# Patient Record
Sex: FEMALE | Race: WHITE | URBAN - METROPOLITAN AREA
[De-identification: names, ages, dates, MRNs, and addresses within clinical notes are randomized per-mention and may not be internally consistent; named-entity substitution may affect disease eponyms.]

---

## 2024-04-29 ENCOUNTER — HOSPITAL ENCOUNTER (OUTPATIENT)
Age: 64
Discharge: HOME | End: 2024-04-29
Payer: COMMERCIAL

## 2024-04-29 VITALS
OXYGEN SATURATION: 98 % | TEMPERATURE: 98 F | HEART RATE: 72 BPM | RESPIRATION RATE: 20 BRPM | SYSTOLIC BLOOD PRESSURE: 153 MMHG | DIASTOLIC BLOOD PRESSURE: 86 MMHG

## 2024-04-29 DIAGNOSIS — S76.011A HIP STRAIN, RIGHT, INITIAL ENCOUNTER: Primary | ICD-10-CM

## 2024-04-29 PROCEDURE — 99203 OFFICE O/P NEW LOW 30 MIN: CPT | Performed by: NURSE PRACTITIONER

## 2024-04-29 RX ORDER — ESCITALOPRAM OXALATE 10 MG/1
10 TABLET ORAL DAILY
COMMUNITY
Start: 2024-02-21

## 2024-04-29 RX ORDER — NIFEDIPINE 30 MG/1
TABLET, EXTENDED RELEASE ORAL
COMMUNITY
Start: 2024-03-13

## 2024-04-29 RX ORDER — TRIAMTERENE AND HYDROCHLOROTHIAZIDE 37.5; 25 MG/1; MG/1
CAPSULE ORAL
COMMUNITY
Start: 2024-03-28

## 2024-04-29 RX ORDER — TIZANIDINE 2 MG/1
2 TABLET ORAL EVERY 6 HOURS PRN
Qty: 15 TABLET | Refills: 0 | Status: SHIPPED | OUTPATIENT
Start: 2024-04-29

## 2024-04-29 RX ORDER — AMITRIPTYLINE HYDROCHLORIDE 10 MG/1
TABLET, FILM COATED ORAL
COMMUNITY
Start: 2024-04-01

## 2024-04-29 RX ORDER — CLOBETASOL PROPIONATE 0.5 MG/G
OINTMENT TOPICAL
COMMUNITY
Start: 2024-01-28

## 2024-04-29 RX ORDER — NAPROXEN 500 MG/1
500 TABLET ORAL 2 TIMES DAILY PRN
Qty: 20 TABLET | Refills: 0 | Status: SHIPPED | OUTPATIENT
Start: 2024-04-29 | End: 2024-05-09

## 2024-04-29 RX ORDER — VALACYCLOVIR HYDROCHLORIDE 500 MG/1
TABLET, FILM COATED ORAL
COMMUNITY
Start: 2024-04-21

## 2024-04-29 NOTE — ED INITIAL ASSESSMENT (HPI)
Pt here with complaints of right hip and buttocks pain that began 1 week ago , pt denies any injury , pt states was out of town and was sleeping on a bad mattress, pt states she has pain when bending or lifting

## 2025-01-06 ENCOUNTER — ORDER TRANSCRIPTION (OUTPATIENT)
Dept: PHYSICAL THERAPY | Facility: HOSPITAL | Age: 65
End: 2025-01-06

## 2025-01-06 DIAGNOSIS — S32.020A CLOSED COMPRESSION FRACTURE OF L2 VERTEBRA (HCC): ICD-10-CM

## 2025-01-06 DIAGNOSIS — M54.50 ACUTE MIDLINE LOW BACK PAIN WITHOUT SCIATICA: ICD-10-CM

## 2025-01-06 DIAGNOSIS — R29.6 FALLS FREQUENTLY: Primary | ICD-10-CM

## 2025-02-06 ENCOUNTER — TELEPHONE (OUTPATIENT)
Dept: PHYSICAL THERAPY | Facility: HOSPITAL | Age: 65
End: 2025-02-06

## 2025-02-07 ENCOUNTER — OFFICE VISIT (OUTPATIENT)
Dept: PHYSICAL THERAPY | Facility: HOSPITAL | Age: 65
End: 2025-02-07
Attending: PHYSICAL MEDICINE & REHABILITATION
Payer: COMMERCIAL

## 2025-02-07 DIAGNOSIS — R29.6 FALLS FREQUENTLY: Primary | ICD-10-CM

## 2025-02-07 DIAGNOSIS — S32.020A CLOSED COMPRESSION FRACTURE OF L2 VERTEBRA (HCC): ICD-10-CM

## 2025-02-07 DIAGNOSIS — M54.50 ACUTE MIDLINE LOW BACK PAIN WITHOUT SCIATICA: ICD-10-CM

## 2025-02-07 PROCEDURE — 97162 PT EVAL MOD COMPLEX 30 MIN: CPT

## 2025-02-14 ENCOUNTER — OFFICE VISIT (OUTPATIENT)
Dept: PHYSICAL THERAPY | Facility: HOSPITAL | Age: 65
End: 2025-02-14
Attending: PHYSICAL MEDICINE & REHABILITATION
Payer: COMMERCIAL

## 2025-02-14 PROCEDURE — 97112 NEUROMUSCULAR REEDUCATION: CPT

## 2025-02-14 PROCEDURE — 97530 THERAPEUTIC ACTIVITIES: CPT

## 2025-02-14 PROCEDURE — 97110 THERAPEUTIC EXERCISES: CPT

## 2025-02-14 PROCEDURE — 97140 MANUAL THERAPY 1/> REGIONS: CPT

## 2025-02-17 ENCOUNTER — OFFICE VISIT (OUTPATIENT)
Dept: PHYSICAL THERAPY | Facility: HOSPITAL | Age: 65
End: 2025-02-17
Attending: PHYSICAL MEDICINE & REHABILITATION
Payer: COMMERCIAL

## 2025-02-17 ENCOUNTER — ORDER TRANSCRIPTION (OUTPATIENT)
Dept: PHYSICAL THERAPY | Facility: HOSPITAL | Age: 65
End: 2025-02-17

## 2025-02-17 DIAGNOSIS — R29.6 FALLS FREQUENTLY: Primary | ICD-10-CM

## 2025-02-17 PROCEDURE — 97140 MANUAL THERAPY 1/> REGIONS: CPT

## 2025-02-17 PROCEDURE — 97110 THERAPEUTIC EXERCISES: CPT

## 2025-02-17 PROCEDURE — 97112 NEUROMUSCULAR REEDUCATION: CPT

## 2025-02-17 PROCEDURE — 97530 THERAPEUTIC ACTIVITIES: CPT

## 2025-02-18 NOTE — PROGRESS NOTES
SPINE EVALUATION:     Diagnosis:   Compresion fracture s/p kyphoplasty of L2 vertebrae, L knee pain and left leg pain Patient:  Lliiya Stockton (64 year old, female)        Referring Provider: Tamiko Flood  Today's Date   2/17/2025    Precautions:      Date of Evaluation: 02/07/25  Next MD visit: No data recorded  Date of Surgery: 12/12/24     PATIENT SUMMARY   Summary of chief complaints: Low back, left leg/knee pain.  Falls frequently.  Had a VNG done years ago.  History of current condition: Has had a history of falls in the last 2 years. Fell on stair case 12/8/24   Pain level: current  , at best  , at worst    Description of symptoms: Tightness with occassional numbness/tingling (feels sock-like numbness)   Occupation:     Leisure activities/Hobbies:     Prior level of function:    Current limitations: Trouble sitting (has to work at home with standing desk)  Pt goals: Increase mobility, decrease pain, and improve strength  Past medical history was reviewed with Liliya.  Significant findings include: Osteoporosis, L sensorineural complete hearing loss  Imaging/Tests: X-ray, MRI, DEXA scan   Liliya  has a past medical history of Essential hypertension.  She  has no past surgical history on file.    ASSESSMENT  Liliya presents to physical therapy evaluation with primary c/o Low back, left leg/knee pain.  Falls frequently.  Had a VNG done years ago.. The results of the objective tests and measures show  . Functional deficits include but are not limited to Trouble sitting (has to work at home with standing desk). Signs and symptoms are consistent with diagnosis of Compresion fracture s/p kyphoplasty of L2 vertebrae, L knee pain and left leg pain. Pt and PT discussed evaluation findings, pathology, POC and HEP.  Pt voiced understanding and performs HEP correctly without reported pain. Skilled Physical Therapy is medically necessary to address the above impairments and reach functional goals.    OBJECTIVE:    Musculoskeletal:  Observation/Posture: sits with stooped posture   Accessory Motion:     Palpation: hypotonic R QL, lumbar ES, lumbar PVM     Special tests:         CAROLINE ROM WNL and Strength (5/5) except below:  (* denotes performed with pain)  Trunk ROM MMT (-/5)     Flex         Ext        R L R L     Side bend             Rotation           ,   Hip   ROM MMT (-/5)    R L R L     Flex (L2)     5 4     Ext      4 4     Abd     4 3+     ER     4 3+     IR     5 4     ,   Knee   ROM MMT (-/5)    R L R L     Flex             Ext (L3)     4+ 4     ,   Ankle/Foot   ROM MMT (-/5)    R L R L     PF             DF (L4)             Inversion             Eversion             Grt Toe Ext (L5)               Flexibility:  LE Flexibility R L     Hip Flexor         Hamstrings         ITB         Piriformis         Quads         Gastroc-soleus            Neurological:  Sensation: Grossly intact to light touch CAROLINE UE/LE except hyposensitivity to L L4-S1   Deep Tendon Reflexes: WNL except WNL   UMN: signs and symptoms WNL except     Peripheral Neurodynamic: WNL except       Balance and Functional Mobility:  Gait: pt ambulates on level ground with  .  Balance: SLS: EO R  , EO L           Today's Treatment and Response:   Pt education was provided on exam findings, treatment diagnosis, treatment plan, expectations, and prognosis.  Today's Treatment       2/7/2025   PT Treatment   Total of Timed Procedures 0   Total of Service Based 0   Total Treatment Time 0         Patient was instructed in and issued a HEP for: Access Code: [email or web address]  URL: [email or web address]  Date: 02/17/2025  Prepared by: Jad Hernandez    Exercises  - Bird Dog on Counter  - 1 x daily - 7 x weekly - 2 sets - 3 seconds hold - 1 minute total time  - Standing Side Plank on Wall  - 1 x daily - 7 x weekly - 2 sets - 30 seconds hold  - Standing shoulder flexion wall slides  - 1 x daily - 7 x weekly - 2 sets - 1 minute time  - Standing Shoulder  Horizontal Abduction with Resistance  - 1 x daily - 7 x weekly - 2 sets - 2 hold - 1 minute time  - Seated Arch Lifts  - 1 x daily - 7 x weekly - 50 reps  - Toe Yoga - Alternating Great Toe and Lesser Toe Extension  - 1 x daily - 7 x weekly - 50 reps  - Toe Spreading  - 1 x daily - 7 x weekly - 50 reps    Charges:  PT EVAL: Moderate Complexity,    In agreement with evaluation findings and clinical rationale, this evaluation involved MODERATE COMPLEXITY decision making due to 1-2 personal factors/comorbidities, 3 or more body structures involved/activity limitations, and evolving symptoms as documented in the evaluation.                                                                         PLAN OF CARE:    Goals: (to be met in 12 visits)   Goals       Therapy Goals      Not Met Progress Toward Partially Met Met   Pt will improve transversus abdominis recruitment to perform proper isometric contraction without requiring verbal or tactile cuing to promote advancement of therex. [] [] [] []   Pt will demonstrate good understanding of proper posture and body mechanics to decrease pain and improve spinal safety. [] [] [] []          Pt will report improved symptom centralization and absence of radicular symptoms for 3 consecutive days to improve function with ADLs. [] [] [] []   Pt will have decreased paraspinal mm tension to tolerate standing >30 minutes for work and home activities. [] [] [] []   Pt will demonstrate improved core strength to be able to perform squat to chair with <2/10 pain. [] [] [] []   Pt will be independent and compliant with comprehensive HEP to maintain progress achieved in PT [] [] [] []                   Frequency / Duration: Patient will be seen 2x/week or a total of 12  visits over a 90 day period. Treatment will include: Manual Therapy; Neuromuscular Re-education; Home Exercise Program instruction; Therapeutic Activities; Therapeutic Exercise    Education or treatment limitation:  None   Rehab Potential: fair     Oswestry Disability Index Score  Score: (Proxy-Rptd) 54 % (2/7/2025  2:07 PM)      Patient/Family/Caregiver was advised of these findings, precautions, and treatment options and has agreed to actively participate in planning and for this course of care.    Thank you for your referral. Please co-sign or sign and return this letter via fax as soon as possible to [phone]. If you have any questions, please contact me at Dept: [phone]    Sincerely,  Electronically signed by therapist: Jad Hernandez PT  Physician's certification required: Yes  I certify the need for these services furnished under this plan of treatment and while under my care.    X___________________________________________________ Date____________________    Certification From: 2/17/2025  To:5/18/2025

## 2025-02-20 NOTE — PROGRESS NOTES
Patient: Liliya Stockton (64 year old, female) Referring Provider:  Insurance:   Diagnosis: Compresion fracture s/p kyphoplasty of L2 vertebrae, L knee pain and left leg pain Tamiko Mendozasalvador  [employer]   Date of Surgery: 12/12/24 Next MD visit:  N/A   Precautions:    No data recorded Referral Information:    Date of Evaluation: Req: 0, Auth: 0, Exp:     02/07/25 POC Auth Visits:          Today's Date   2/14/2025    Subjective          Pain: 5/10     Objective  see flowsheet                Assessment  tolerated all treatment well    Goals (to be met in 12 visits)   Goals       Therapy Goals      Not Met Progress Toward Partially Met Met   Pt will improve transversus abdominis recruitment to perform proper isometric contraction without requiring verbal or tactile cuing to promote advancement of therex. [] [] [] []   Pt will demonstrate good understanding of proper posture and body mechanics to decrease pain and improve spinal safety. [] [] [] []          Pt will report improved symptom centralization and absence of radicular symptoms for 3 consecutive days to improve function with ADLs. [] [] [] []   Pt will have decreased paraspinal mm tension to tolerate standing >30 minutes for work and home activities. [] [] [] []   Pt will demonstrate improved core strength to be able to perform squat to chair with <2/10 pain. [] [] [] []   Pt will be independent and compliant with comprehensive HEP to maintain progress achieved in PT [] [] [] []                  Plan  continue with plan of care    Treatment Last 4 Visits       2/7/2025 2/14/2025 2/19/2025   PT Treatment   Treatment Day   2   Therapeutic Exercise  Prone leg extension, alternating  Prone UE lift, alternating  Abdominal bracing, legs supported    Neuro Re-Ed  Toe yoga  Toe spreading  Herminio toe curls, 3 x 15      Manual Therapy  STM to L hip ER group    Therapeutic Activity  Mini squat on wall  Step up, 4'' step  Psoas stretch, manual    Therapeutic Exercise Min  15     Neuro Re-Ed Min  10    Manual Therapy Min  10    Therapeutic Activity Min  10    Total of Timed Procedures 0 45    Total of Service Based 0 0    Total Treatment Time 0 45          HEP  Access Code: [email or web address]  URL: [email or web address]  Date: 02/17/2025  Prepared by: Jad Hernandez    Exercises  - Bird Dog on Counter  - 1 x daily - 7 x weekly - 2 sets - 3 seconds hold - 1 minute total time  - Standing Side Plank on Wall  - 1 x daily - 7 x weekly - 2 sets - 30 seconds hold  - Standing shoulder flexion wall slides  - 1 x daily - 7 x weekly - 2 sets - 1 minute time  - Standing Shoulder Horizontal Abduction with Resistance  - 1 x daily - 7 x weekly - 2 sets - 2 hold - 1 minute time  - Seated Arch Lifts  - 1 x daily - 7 x weekly - 50 reps  - Toe Yoga - Alternating Great Toe and Lesser Toe Extension  - 1 x daily - 7 x weekly - 50 reps  - Toe Spreading  - 1 x daily - 7 x weekly - 50 reps    Charges

## 2025-02-21 ENCOUNTER — OFFICE VISIT (OUTPATIENT)
Dept: PHYSICAL THERAPY | Facility: HOSPITAL | Age: 65
End: 2025-02-21
Attending: PHYSICAL MEDICINE & REHABILITATION
Payer: COMMERCIAL

## 2025-02-21 PROCEDURE — 97112 NEUROMUSCULAR REEDUCATION: CPT

## 2025-02-21 PROCEDURE — 97110 THERAPEUTIC EXERCISES: CPT

## 2025-02-21 PROCEDURE — 97140 MANUAL THERAPY 1/> REGIONS: CPT

## 2025-02-21 NOTE — PROGRESS NOTES
Patient: Liliya Stockton (64 year old, female) Referring Provider:  Insurance:   Diagnosis: Compresion fracture s/p kyphoplasty of L2 vertebrae, L knee pain and left leg pain Tamiko Mendozasalvador  [employer]   Date of Surgery: 12/12/24 Next MD visit:  N/A   Precautions:    No data recorded Referral Information:    Date of Evaluation: Req: 0, Auth: 0, Exp:     02/07/25 POC Auth Visits:          Today's Date   2/21/2025    Subjective  Tuesday night had intense pain in the back and leg.  Patient reports that pain was far more significant.  Feels very stiff in movement.  Was working a great deal with high stress on Monday and Tuesday.  Notes that she was sitting for long period of time.       Pain: 7/10     Objective        See flowsheet         Assessment  session foicused on activation of abdominals    Goals (to be met in 12 visits)   Goals       Therapy Goals      Not Met Progress Toward Partially Met Met   Pt will improve transversus abdominis recruitment to perform proper isometric contraction without requiring verbal or tactile cuing to promote advancement of therex. [] [] [] []   Pt will demonstrate good understanding of proper posture and body mechanics to decrease pain and improve spinal safety. [] [] [] []          Pt will report improved symptom centralization and absence of radicular symptoms for 3 consecutive days to improve function with ADLs. [] [] [] []   Pt will have decreased paraspinal mm tension to tolerate standing >30 minutes for work and home activities. [] [] [] []   Pt will demonstrate improved core strength to be able to perform squat to chair with <2/10 pain. [] [] [] []   Pt will be independent and compliant with comprehensive HEP to maintain progress achieved in PT [] [] [] []                  Plan  continue with plan of care    Treatment Last 4 Visits       2/14/2025 2/17/2025 2/19/2025 2/21/2025   PT Treatment   Treatment Day   3    2 4   Therapeutic Exercise Prone leg extension,  alternating  Prone UE lift, alternating  Abdominal bracing, legs supported Prone leg extension, alternating  Prone UE lift, alternating  Abdominal bracing, legs supported       Neuro Re-Ed Toe yoga  Toe spreading  Harmony toe curls, 3 x 15   Toe yoga  Toe spreading  Harmony toe curls, 3 x 15    AB brace with breathing  Ab brace with march  Ab brace with shoulder extension into swiss ball   Manual Therapy STM to L hip ER group STM to L hip ER group  STM to L gluteals and piriformis   Therapeutic Activity Mini squat on wall  Step up, 4'' step  Psoas stretch, manual Mini squat on wall  Step up, 4'' step  Psoas stretch, manual      Dowel lilian hip hinge, seated  Discussion of lifting mechanics, verbalized and demonstrated proper form from floor   Therapeutic Exercise Min 15 10     Neuro Re-Ed Min 10 10  25   Manual Therapy Min 10 10  10   Therapeutic Activity Min 10 10  10   Total of Timed Procedures 45 40  45   Total of Service Based 0 0  0   Total Treatment Time 45 40  45       Multiple values from one day are sorted in reverse-chronological order         HEP  Access Code: [email or web address]  URL: [email or web address]  Date: 02/17/2025  Prepared by: Jad Hernandez    Exercises  - Bird Dog on Counter  - 1 x daily - 7 x weekly - 2 sets - 3 seconds hold - 1 minute total time  - Standing Side Plank on Wall  - 1 x daily - 7 x weekly - 2 sets - 30 seconds hold  - Standing shoulder flexion wall slides  - 1 x daily - 7 x weekly - 2 sets - 1 minute time  - Standing Shoulder Horizontal Abduction with Resistance  - 1 x daily - 7 x weekly - 2 sets - 2 hold - 1 minute time  - Seated Arch Lifts  - 1 x daily - 7 x weekly - 50 reps  - Toe Yoga - Alternating Great Toe and Lesser Toe Extension  - 1 x daily - 7 x weekly - 50 reps  - Toe Spreading  - 1 x daily - 7 x weekly - 50 reps    Charges     1 MT, 2 NMR, 1 TA

## 2025-02-28 ENCOUNTER — OFFICE VISIT (OUTPATIENT)
Dept: PHYSICAL THERAPY | Facility: HOSPITAL | Age: 65
End: 2025-02-28
Attending: PHYSICAL MEDICINE & REHABILITATION
Payer: COMMERCIAL

## 2025-02-28 PROCEDURE — 97530 THERAPEUTIC ACTIVITIES: CPT

## 2025-02-28 PROCEDURE — 97140 MANUAL THERAPY 1/> REGIONS: CPT

## 2025-02-28 PROCEDURE — 97110 THERAPEUTIC EXERCISES: CPT

## 2025-03-03 ENCOUNTER — OFFICE VISIT (OUTPATIENT)
Dept: PHYSICAL THERAPY | Facility: HOSPITAL | Age: 65
End: 2025-03-03
Attending: PHYSICAL MEDICINE & REHABILITATION
Payer: COMMERCIAL

## 2025-03-03 PROCEDURE — 97530 THERAPEUTIC ACTIVITIES: CPT

## 2025-03-03 PROCEDURE — 97112 NEUROMUSCULAR REEDUCATION: CPT

## 2025-03-03 PROCEDURE — 97110 THERAPEUTIC EXERCISES: CPT

## 2025-03-03 NOTE — PROGRESS NOTES
Patient: Liliya Stockton (64 year old, female) Referring Provider:  Insurance:   Diagnosis: Compresion fracture s/p kyphoplasty of L2 vertebrae, L knee pain and left leg pain Tamiko Mendozasalvador  [employer]   Date of Surgery: 12/12/24 Next MD visit:  N/A   Precautions:    No data recorded Referral Information:    Date of Evaluation: Req: 0, Auth: 0, Exp:     02/07/25 POC Auth Visits:          Today's Date   3/3/2025    Subjective  Has trouble with bending to lift things from floor.  Also, has generalized soreness in low back when walking or standing for a long duration.       Pain: 5/10     Objective  Poor hip hinge mechanics, hypolordosis of spine with bending.                Assessment  Patient lacks hip hinge during forward bending.  Patient required max cues for hip hinge in quadruped.  Utlized swiss ball under core for 4-point exercises to accomodate for weakness in all extremities.    Goals (to be met in 12 visits)   Goals       Therapy Goals      Not Met Progress Toward Partially Met Met   Pt will improve transversus abdominis recruitment to perform proper isometric contraction without requiring verbal or tactile cuing to promote advancement of therex. [] [] [] []   Pt will demonstrate good understanding of proper posture and body mechanics to decrease pain and improve spinal safety. [] [] [] []          Pt will report improved symptom centralization and absence of radicular symptoms for 3 consecutive days to improve function with ADLs. [] [] [] []   Pt will have decreased paraspinal mm tension to tolerate standing >30 minutes for work and home activities. [] [] [] []   Pt will demonstrate improved core strength to be able to perform squat to chair with <2/10 pain. [] [] [] []   Pt will be independent and compliant with comprehensive HEP to maintain progress achieved in PT [] [] [] []                  Plan  Continue with progressive core stabilization and balance.    Treatment Last 4 Visits       2/19/2025 2/21/2025  2/28/2025 3/3/2025   PT Treatment   Treatment Day 3    2 4  5   Therapeutic Exercise     AB brace with breathing  Ab brace with march  Ab brace with shoulder extension into swiss ball     Prone leg extension, alternating, 5'' hold x 10 each, 3 reps  Quadruped alternating tripod reach, 2 x 10  Bridge with resisted hip ABD, 2 x 10  Clamshell with abdominal activation, 3 x 10 each  Hook lying shoulder extension into ball, 10'' x 10 reps, 2 sets   Neuro Re-Ed  AB brace with breathing  Ab brace with march  Ab brace with shoulder extension into swiss ball  Wobble board taps fwd/back   Wobble board hovers  Single leg cone tap on foam   Manual Therapy  STM to L gluteals and piriformis STM to L gluteals and piriformis    Therapeutic Activity  Dowel lilian hip hinge, seated  Discussion of lifting mechanics, verbalized and demonstrated proper form from floor Dowel lilian hip hinge, seated  Discussion of lifting mechanics, verbalized and demonstrated proper form from floor   Hip hinge in quadruped (max cues)  Sit<>stand from elevated table  Rows, seated, green band (cues for increased scap retraction)  Seated press up, dowel lilian (add weight)   Therapeutic Exercise Min   15 15   Neuro Re-Ed Min  25  15   Manual Therapy Min  10 15    Therapeutic Activity Min  10 15 15   Total of Timed Procedures  45 45 45   Total of Service Based  0 0 0   Total Treatment Time  45 45 45       Multiple values from one day are sorted in reverse-chronological order         HEP  Access Code: [email or web address]  URL: [email or web address]  Date: 02/17/2025  Prepared by: Jad Hernandez    Exercises  - Bird Dog on Counter  - 1 x daily - 7 x weekly - 2 sets - 3 seconds hold - 1 minute total time  - Standing Side Plank on Wall  - 1 x daily - 7 x weekly - 2 sets - 30 seconds hold  - Standing shoulder flexion wall slides  - 1 x daily - 7 x weekly - 2 sets - 1 minute time  - Standing Shoulder Horizontal Abduction with Resistance  - 1 x daily - 7 x weekly  - 2 sets - 2 hold - 1 minute time  - Seated Arch Lifts  - 1 x daily - 7 x weekly - 50 reps  - Toe Yoga - Alternating Great Toe and Lesser Toe Extension  - 1 x daily - 7 x weekly - 50 reps  - Toe Spreading  - 1 x daily - 7 x weekly - 50 reps    Charges     1 TE, TA, NMR

## 2025-03-03 NOTE — PROGRESS NOTES
Patient: Liliya Stockton (64 year old, female) Referring Provider:  Insurance:   Diagnosis: Compresion fracture s/p kyphoplasty of L2 vertebrae, L knee pain and left leg pain Tamiko Mendozasalvador  [employer]   Date of Surgery: 12/12/24 Next MD visit:  N/A   Precautions:    No data recorded Referral Information:    Date of Evaluation: Req: 0, Auth: 0, Exp:     02/07/25 POC Auth Visits:          Today's Date   2/28/2025    Subjective  Feels less pain in her legs.       Pain: 6/10     Objective  see flowsheet                Assessment  session foicused on activation of abdominals    Goals (to be met in 12 visits)   Goals       Therapy Goals      Not Met Progress Toward Partially Met Met   Pt will improve transversus abdominis recruitment to perform proper isometric contraction without requiring verbal or tactile cuing to promote advancement of therex. [] [] [] []   Pt will demonstrate good understanding of proper posture and body mechanics to decrease pain and improve spinal safety. [] [] [] []          Pt will report improved symptom centralization and absence of radicular symptoms for 3 consecutive days to improve function with ADLs. [] [] [] []   Pt will have decreased paraspinal mm tension to tolerate standing >30 minutes for work and home activities. [] [] [] []   Pt will demonstrate improved core strength to be able to perform squat to chair with <2/10 pain. [] [] [] []   Pt will be independent and compliant with comprehensive HEP to maintain progress achieved in PT [] [] [] []                  Plan  continue with plan of care    Treatment Last 4 Visits       2/17/2025 2/19/2025 2/21/2025 2/28/2025   PT Treatment   Treatment Day  3    2 4    Therapeutic Exercise Prone leg extension, alternating  Prone UE lift, alternating  Abdominal bracing, legs supported       AB brace with breathing  Ab brace with march  Ab brace with shoulder extension into swiss ball       Neuro Re-Ed Toe yoga  Toe spreading  Herminio toe curls, 3 x  15    AB brace with breathing  Ab brace with march  Ab brace with shoulder extension into swiss ball    Manual Therapy STM to L hip ER group  STM to L gluteals and piriformis STM to L gluteals and piriformis   Therapeutic Activity Mini squat on wall  Step up, 4'' step  Psoas stretch, manual      Dowel lilian hip hinge, seated  Discussion of lifting mechanics, verbalized and demonstrated proper form from floor Dowel lilian hip hinge, seated  Discussion of lifting mechanics, verbalized and demonstrated proper form from floor     Therapeutic Exercise Min 10   15   Neuro Re-Ed Min 10  25    Manual Therapy Min 10  10 15   Therapeutic Activity Min 10  10 15   Total of Timed Procedures 40  45 45   Total of Service Based 0  0 0   Total Treatment Time 40  45 45       Multiple values from one day are sorted in reverse-chronological order         HEP  Access Code: [email or web address]  URL: [email or web address]  Date: 02/17/2025  Prepared by: Jad Hernandez    Exercises  - Bird Dog on Counter  - 1 x daily - 7 x weekly - 2 sets - 3 seconds hold - 1 minute total time  - Standing Side Plank on Wall  - 1 x daily - 7 x weekly - 2 sets - 30 seconds hold  - Standing shoulder flexion wall slides  - 1 x daily - 7 x weekly - 2 sets - 1 minute time  - Standing Shoulder Horizontal Abduction with Resistance  - 1 x daily - 7 x weekly - 2 sets - 2 hold - 1 minute time  - Seated Arch Lifts  - 1 x daily - 7 x weekly - 50 reps  - Toe Yoga - Alternating Great Toe and Lesser Toe Extension  - 1 x daily - 7 x weekly - 50 reps  - Toe Spreading  - 1 x daily - 7 x weekly - 50 reps    Charges     1 TA, TE, MT

## 2025-03-07 ENCOUNTER — OFFICE VISIT (OUTPATIENT)
Dept: PHYSICAL THERAPY | Facility: HOSPITAL | Age: 65
End: 2025-03-07
Attending: PHYSICAL MEDICINE & REHABILITATION
Payer: COMMERCIAL

## 2025-03-07 PROCEDURE — 97110 THERAPEUTIC EXERCISES: CPT

## 2025-03-07 PROCEDURE — 97530 THERAPEUTIC ACTIVITIES: CPT

## 2025-03-07 PROCEDURE — 97112 NEUROMUSCULAR REEDUCATION: CPT

## 2025-03-10 ENCOUNTER — OFFICE VISIT (OUTPATIENT)
Dept: PHYSICAL THERAPY | Facility: HOSPITAL | Age: 65
End: 2025-03-10
Attending: PHYSICAL MEDICINE & REHABILITATION
Payer: COMMERCIAL

## 2025-03-10 PROCEDURE — 97140 MANUAL THERAPY 1/> REGIONS: CPT

## 2025-03-10 PROCEDURE — 97112 NEUROMUSCULAR REEDUCATION: CPT

## 2025-03-10 PROCEDURE — 97530 THERAPEUTIC ACTIVITIES: CPT

## 2025-03-10 PROCEDURE — 97110 THERAPEUTIC EXERCISES: CPT

## 2025-03-10 NOTE — PROGRESS NOTES
Patient: Liliya Stockton (64 year old, female) Referring Provider:  Insurance:   Diagnosis: Compresion fracture s/p kyphoplasty of L2 vertebrae, L knee pain and left leg pain Tamiko Flood  [employer]   Date of Surgery: 12/12/24 Next MD visit:  N/A   Precautions:    No data recorded Referral Information:    Date of Evaluation: Req: 0, Auth: 0, Exp:     02/07/25 POC Auth Visits:          Today's Date   3/10/2025    Subjective  Went swimming for 20 minutes, and felt a good sore.  Still has stiffness/pain, and difficulty moving due to low back and sacral region pain.       Pain: 5/10     Objective  see flowsheet            Assessment  Hip tightness is causing overcompensation with the lower back to perform most movements.  Continues to have poor strength in posterior chain.    Goals (to be met in 12 visits)        Plan  Continue with progressive core stabilization and balance.    Treatment Last 4 Visits       2/21/2025 2/28/2025 3/3/2025 3/10/2025   PT Treatment   Treatment Day 4  5 7    6   Therapeutic Exercise    AB brace with breathing  Ab brace with march  Ab brace with shoulder extension into swiss ball     Prone leg extension, alternating, 5'' hold x 10 each, 3 reps  Quadruped alternating tripod reach, 2 x 10  Bridge with resisted hip ABD, 2 x 10  Clamshell with abdominal activation, 3 x 10 each  Hook lying shoulder extension into ball, 10'' x 10 reps, 2 sets    Neuro Re-Ed AB brace with breathing  Ab brace with march  Ab brace with shoulder extension into swiss ball  Wobble board taps fwd/back   Wobble board hovers  Single leg cone tap on foam    Manual Therapy STM to L gluteals and piriformis STM to L gluteals and piriformis     Therapeutic Activity Dowel lilian hip hinge, seated  Discussion of lifting mechanics, verbalized and demonstrated proper form from floor Dowel lilian hip hinge, seated  Discussion of lifting mechanics, verbalized and demonstrated proper form from floor   Hip hinge in quadruped (max  cues)  Sit<>stand from elevated table  Rows, seated, green band (cues for increased scap retraction)  Seated press up, dowel lilian (add weight)    Therapeutic Exercise Min  15 15    Neuro Re-Ed Min 25  15    Manual Therapy Min 10 15     Therapeutic Activity Min 10 15 15    Total of Timed Procedures 45 45 45    Total of Service Based 0 0 0    Total Treatment Time 45 45 45        Multiple values from one day are sorted in reverse-chronological order          2/19/2025 2/21/2025 3/3/2025 3/10/2025   Spine Treatment   Treatment Day 3    2 4 5 7    6   Therapeutic Exercise    Swiss ball quadruped alternating leg extensions  Glute bridge  Standing row, staggered stance  Hooklying with perurbations   Neuro Re-Education    Paloff press, standing, 3 x 15 each  Wall plank + rotation, 3 x 8 each     Manual Therapy    R gluteals STM  Manual R gluteal stretch  STM to B QL   Therapeutic Activity    Step up, with ab brace, 3 x 10  Squat on wall with ball, 2 x 20     Therapeutic Exercise Minutes    10   Neuro Re-Educ Minutes    10   Manual Therapy Minutes    10   Therapeutic Activity Minutes    10   Total Time Of Timed Procedures    40   Total Time Of Service-Based Procedures    0   Total Treatment Time    40       Multiple values from one day are sorted in reverse-chronological order        HEP       Charges

## 2025-03-11 NOTE — PROGRESS NOTES
Patient: Liliya Stockton (64 year old, female) Referring Provider:  Insurance:   Diagnosis: Compresion fracture s/p kyphoplasty of L2 vertebrae, L knee pain and left leg pain Tamiko Mendozasalvador [employer]   Date of Surgery: 12/12/24 Next MD visit:  N/A   Precautions:    No data recorded Referral Information:    Date of Evaluation: Req: 0, Auth: 0, Exp:     02/07/25 POC Auth Visits:          Today's Date   3/7/2025    Subjective  Feels a good soreness       Pain: 5/10     Objective  see flowsheet            Assessment  Hip tightness is causing overcompensation with the lower back to perform most movements.  Continues to have poor strength in posterior chain.    Goals (to be met in 12 visits)        Plan  Continue with progressive core stabilization and balance.    Treatment Last 4 Visits       2/21/2025 2/28/2025 3/3/2025 3/10/2025   PT Treatment   Treatment Day 4  5 6    7    6   Therapeutic Exercise    AB brace with breathing  Ab brace with march  Ab brace with shoulder extension into swiss ball     Prone leg extension, alternating, 5'' hold x 10 each, 3 reps  Quadruped alternating tripod reach, 2 x 10  Bridge with resisted hip ABD, 2 x 10  Clamshell with abdominal activation, 3 x 10 each  Hook lying shoulder extension into ball, 10'' x 10 reps, 2 sets    Neuro Re-Ed AB brace with breathing  Ab brace with march  Ab brace with shoulder extension into swiss ball  Wobble board taps fwd/back   Wobble board hovers  Single leg cone tap on foam    Manual Therapy STM to L gluteals and piriformis STM to L gluteals and piriformis     Therapeutic Activity Dowel lilian hip hinge, seated  Discussion of lifting mechanics, verbalized and demonstrated proper form from floor Dowel lilian hip hinge, seated  Discussion of lifting mechanics, verbalized and demonstrated proper form from floor   Hip hinge in quadruped (max cues)  Sit<>stand from elevated table  Rows, seated, green band (cues for increased scap retraction)  Seated press up, dowel  lilian (add weight)    Therapeutic Exercise Min  15 15    Neuro Re-Ed Min 25  15    Manual Therapy Min 10 15     Therapeutic Activity Min 10 15 15    Total of Timed Procedures 45 45 45    Total of Service Based 0 0 0    Total Treatment Time 45 45 45        Multiple values from one day are sorted in reverse-chronological order          2/21/2025 3/3/2025 3/7/2025 3/10/2025   Spine Treatment   Treatment Day 4 5  6    7    6   Therapeutic Exercise   Prone leg extension, alternating, 5'' hold x 10 each, 3 reps  Quadruped alternating tripod reach, 2 x 10  Bridge with resisted hip ABD, 2 x 10  Clamshell with abdominal activation, 3 x 10 each  Hook lying shoulder extension into ball, 10'' x 10 reps, 2 sets   Swiss ball quadruped alternating leg extensions  Glute bridge  Standing row, staggered stance  Hooklying with perurbations   Neuro Re-Education   Wobble board taps fwd/back   Wobble board hovers  Single leg cone tap on foam   Paloff press, standing, 3 x 15 each  Wall plank + rotation, 3 x 8 each     Manual Therapy    R gluteals STM  Manual R gluteal stretch  STM to B QL   Therapeutic Activity   Hip hinge in quadruped (max cues)  Sit<>stand from elevated table  Rows, seated, green band (cues for increased scap retraction)  Seated press up, dowel lilian (add weight)   Step up, with ab brace, 3 x 10  Squat on wall with ball, 2 x 20     Therapeutic Exercise Minutes   15 10   Neuro Re-Educ Minutes   15 10   Manual Therapy Minutes   15 10   Therapeutic Activity Minutes    10   Total Time Of Timed Procedures   45 40   Total Time Of Service-Based Procedures   0 0   Total Treatment Time   45 40       Multiple values from one day are sorted in reverse-chronological order        HEP       Charges                             (3) no apparent problem

## 2025-03-14 ENCOUNTER — OFFICE VISIT (OUTPATIENT)
Dept: PHYSICAL THERAPY | Facility: HOSPITAL | Age: 65
End: 2025-03-14
Attending: PHYSICAL MEDICINE & REHABILITATION
Payer: COMMERCIAL

## 2025-03-14 PROCEDURE — 97140 MANUAL THERAPY 1/> REGIONS: CPT

## 2025-03-14 PROCEDURE — 97112 NEUROMUSCULAR REEDUCATION: CPT

## 2025-03-14 PROCEDURE — 97110 THERAPEUTIC EXERCISES: CPT

## 2025-03-14 PROCEDURE — 97530 THERAPEUTIC ACTIVITIES: CPT

## 2025-03-14 NOTE — PROGRESS NOTES
Patient: Liliya Stockton (64 year old, female) Referring Provider:  Insurance:   Diagnosis: Compresion fracture s/p kyphoplasty of L2 vertebrae, L knee pain and left leg pain Tamiko Mendozasalvador  [employer]   Date of Surgery: 12/12/24 Next MD visit:  N/A   Precautions:    No data recorded Referral Information:    Date of Evaluation: Req: 0, Auth: 0, Exp:     02/07/25 POC Auth Visits:          Today's Date   3/14/2025    Subjective  feels less soreness with movements.  Still feels constant tightness in her back with most activities.       Pain: 5/10     Objective  see flowsheet          Assessment  Posterior pelvic mobilization allowed for improved hip and squat techniques.  Has very limited activation of the L glute compared to the R.  Provided education on HEP for clamshell with manual tactile cues to activate the posterior chain.    Goals (to be met in 12 visits)        Plan  Continue with progressive core stabilization and balance.    Treatment Last 4 Visits       2/28/2025 3/3/2025 3/10/2025 3/14/2025   PT Treatment   Treatment Day  5 6    7    6 7   Therapeutic Exercise   AB brace with breathing  Ab brace with march  Ab brace with shoulder extension into swiss ball     Prone leg extension, alternating, 5'' hold x 10 each, 3 reps  Quadruped alternating tripod reach, 2 x 10  Bridge with resisted hip ABD, 2 x 10  Clamshell with abdominal activation, 3 x 10 each  Hook lying shoulder extension into ball, 10'' x 10 reps, 2 sets     Neuro Re-Ed  Wobble board taps fwd/back   Wobble board hovers  Single leg cone tap on foam     Manual Therapy STM to L gluteals and piriformis      Therapeutic Activity Dowel lilian hip hinge, seated  Discussion of lifting mechanics, verbalized and demonstrated proper form from floor   Hip hinge in quadruped (max cues)  Sit<>stand from elevated table  Rows, seated, green band (cues for increased scap retraction)  Seated press up, dowel lilian (add weight)     Therapeutic Exercise Min 15 15     Neuro  Re-Ed Min  15     Manual Therapy Min 15      Therapeutic Activity Min 15 15     Total of Timed Procedures 45 45     Total of Service Based 0 0     Total Treatment Time 45 45         Multiple values from one day are sorted in reverse-chronological order          3/3/2025 3/7/2025 3/10/2025 3/14/2025   Spine Treatment   Treatment Day 5  6    7    6 7   Therapeutic Exercise  Prone leg extension, alternating, 5'' hold x 10 each, 3 reps  Quadruped alternating tripod reach, 2 x 10  Bridge with resisted hip ABD, 2 x 10  Clamshell with abdominal activation, 3 x 10 each  Hook lying shoulder extension into ball, 10'' x 10 reps, 2 sets   Swiss ball quadruped alternating leg extensions  Glute bridge  Standing row, staggered stance  Hooklying with perurbations Alternating LE extension, abdomen supported on swiss ball, 3 x 10  Cat/camel x 10  Standing rows, shoulder extension, 2 x 10, green     Neuro Re-Education  Wobble board taps fwd/back   Wobble board hovers  Single leg cone tap on foam   Paloff press, standing, 3 x 15 each  Wall plank + rotation, 3 x 8 each   Clam shell with manual feedback for gluteal activation  SL hip abduction with manual feedback  L side sciatic nerve flossers, manual   Manual Therapy   R gluteals STM  Manual R gluteal stretch  STM to B QL Posterior pelvic mobilization, R leg  STM to L hamstring and deep rotators   Therapeutic Activity  Hip hinge in quadruped (max cues)  Sit<>stand from elevated table  Rows, seated, green band (cues for increased scap retraction)  Seated press up, dowel lilian (add weight)   Step up, with ab brace, 3 x 10  Squat on wall with ball, 2 x 20   Potty squats, 3 x 10'', 24'' box  Shortstop hip hinge, 3 x 10     Therapeutic Exercise Minutes  15 10 10   Neuro Re-Educ Minutes  15 10 10   Manual Therapy Minutes  15 10 10   Therapeutic Activity Minutes   10 10   Total Time Of Timed Procedures  45 40 40   Total Time Of Service-Based Procedures  0 0 0   Total Treatment Time  45 40 40        Multiple values from one day are sorted in reverse-chronological order        HEP       Charges  1 NMR, TE, TA, MT

## 2025-03-19 ENCOUNTER — TELEPHONE (OUTPATIENT)
Dept: PHYSICAL THERAPY | Facility: HOSPITAL | Age: 65
End: 2025-03-19

## 2025-03-24 ENCOUNTER — TELEPHONE (OUTPATIENT)
Dept: PHYSICAL THERAPY | Facility: HOSPITAL | Age: 65
End: 2025-03-24

## 2025-03-24 ENCOUNTER — APPOINTMENT (OUTPATIENT)
Dept: PHYSICAL THERAPY | Facility: HOSPITAL | Age: 65
End: 2025-03-24
Attending: PHYSICAL MEDICINE & REHABILITATION
Payer: COMMERCIAL

## 2025-03-28 ENCOUNTER — APPOINTMENT (OUTPATIENT)
Dept: PHYSICAL THERAPY | Facility: HOSPITAL | Age: 65
End: 2025-03-28
Attending: PHYSICAL MEDICINE & REHABILITATION
Payer: COMMERCIAL

## 2025-03-31 ENCOUNTER — OFFICE VISIT (OUTPATIENT)
Dept: PHYSICAL THERAPY | Facility: HOSPITAL | Age: 65
End: 2025-03-31
Attending: PHYSICAL MEDICINE & REHABILITATION
Payer: COMMERCIAL

## 2025-03-31 PROCEDURE — 97110 THERAPEUTIC EXERCISES: CPT

## 2025-03-31 PROCEDURE — 97140 MANUAL THERAPY 1/> REGIONS: CPT

## 2025-03-31 PROCEDURE — 97112 NEUROMUSCULAR REEDUCATION: CPT

## 2025-03-31 PROCEDURE — 97530 THERAPEUTIC ACTIVITIES: CPT

## 2025-03-31 NOTE — PROGRESS NOTES
Patient: Liliya Stockton (64 year old, female) Referring Provider:  Insurance:   Diagnosis: Compresion fracture s/p kyphoplasty of L2 vertebrae, L knee pain and left leg pain Tamiko Whitepaulie  [employer] POS   Date of Surgery: 12/12/24 Next MD visit:  N/A   Precautions:    No data recorded Referral Information:    Date of Evaluation: Req: 0, Auth: 0, Exp:     02/07/25 POC Auth Visits:          Today's Date   3/31/2025    Subjective  Feels more stiff at the end of a day.  Feels like her back is constantly in need of stretching.       Pain: 5/10     Objective  see flowsheet            Assessment  Posterior pelvic rotation continues to allow for improved fluidity with movement.  Continued with exercise focused on improving hip hinge and gluteal activation.    Goals (to be met in 12 visits)        Plan  Continue with progressive core stabilization and balance.    Treatment Last 4 Visits  Treatment Day: 8       3/7/2025 3/10/2025 3/14/2025 3/31/2025   Spine Treatment   Therapeutic Exercise Prone leg extension, alternating, 5'' hold x 10 each, 3 reps  Quadruped alternating tripod reach, 2 x 10  Bridge with resisted hip ABD, 2 x 10  Clamshell with abdominal activation, 3 x 10 each  Hook lying shoulder extension into ball, 10'' x 10 reps, 2 sets   Swiss ball quadruped alternating leg extensions  Glute bridge  Standing row, staggered stance  Hooklying with perurbations Alternating LE extension, abdomen supported on swiss ball, 3 x 10  Cat/camel x 10  Standing rows, shoulder extension, 2 x 10, green   Alternating LE extension, abdomen supported on swiss ball, 3 x 10  Cat/camel x 10  Standing rows, shoulder extension, 2 x 10, green     Neuro Re-Education Wobble board taps fwd/back   Wobble board hovers  Single leg cone tap on foam   Paloff press, standing, 3 x 15 each  Wall plank + rotation, 3 x 8 each   Clam shell with manual feedback for gluteal activation  SL hip abduction with manual feedback  L side sciatic nerve flossers, manual  Clam shell with manual feedback for gluteal activation  SL hip abduction with manual feedback  L side sciatic nerve flossers, manual     Manual Therapy  R gluteals STM  Manual R gluteal stretch  STM to B QL Posterior pelvic mobilization, R leg  STM to L hamstring and deep rotators Posterior innominate rotation, gr III, sustained, bilateral  STM to R psoas and B QL   Therapeutic Activity Hip hinge in quadruped (max cues)  Sit<>stand from elevated table  Rows, seated, green band (cues for increased scap retraction)  Seated press up, dowel lilian (add weight)   Step up, with ab brace, 3 x 10  Squat on wall with ball, 2 x 20   Potty squats, 3 x 10'', 24'' box  Shortstop hip hinge, 3 x 10   Potty squats, 3 x 10'', 24'' box  Shortstop hip hinge, 3 x 10     Therapeutic Exercise Minutes 15 10 10 10   Neuro Re-Educ Minutes 15 10 10 10   Manual Therapy Minutes 15 10 10 10   Therapeutic Activity Minutes  10 10 10   Total Time Of Timed Procedures 45 40 40 40   Total Time Of Service-Based Procedures 0 0 0 0   Total Treatment Time 45 40 40 40        HEP       Charges  1 MT, NMR, TE, TA

## 2025-04-02 ENCOUNTER — OFFICE VISIT (OUTPATIENT)
Dept: PHYSICAL THERAPY | Facility: HOSPITAL | Age: 65
End: 2025-04-02
Attending: PHYSICAL MEDICINE & REHABILITATION
Payer: COMMERCIAL

## 2025-04-02 DIAGNOSIS — R29.6 FALLS FREQUENTLY: Primary | ICD-10-CM

## 2025-04-02 PROCEDURE — 97110 THERAPEUTIC EXERCISES: CPT

## 2025-04-02 PROCEDURE — 97163 PT EVAL HIGH COMPLEX 45 MIN: CPT

## 2025-04-02 NOTE — PROGRESS NOTES
NEUROLOGICAL EVALUATION:     Diagnosis:   disequilibrium, frequent falls Patient:  Liliya Stockton (64 year old, female)        Referring Provider: Tamiko Flood  Today's Date   4/2/2025    Precautions:  Fall Risk Date of Evaluation: 04/02/25       PATIENT SUMMARY   Summary of chief complaints:  frequent LOB with +hx of injurious fall     History of current condition:  Pt reports hx of frequent falls \"for years\" with three significant falls in past 18 months. On December 8, 2024 she fell down stairs - there was a rail on these set of stairs but too low to adequately use per her report. This fall resulted in a fracture in her back requiring kyphoplasty. This was her last fall however she admits losing her balance \"at times\" - she is unable to tell me how frequently this occurs. A L AFO has been ordered for her but she has not gotten it yet- plans to go to a clinic in [place]. Reports she was told to use it for \"long distances only\". Has been in PT post back fracture since February 2025 which primarily focuses on strengthening from her report. Of note she has loss of hearing in L ear of unknown cause starting 2003 - considering cochlear implant. Currently trialing a cross hearing aide. Underwent [facility] but apparently there was some issues with the testing and she is questioning the result. She also reports she was told by the audiologist that she has an issue with her brain. She reports an MRI has been ordered. Underwent balance therapy years back which helped at the time.     Pain level: 4/10 up to 5/10 as day progresses can get as low as 0/10 with meds and rest.   Description of symptoms:  low back      Occupation:  works as .   Exercise:  swims for exercise at baseline - has not since October.    Prior level of function:  prior to 2003  Current limitations:  General sense of imbalance - difficulty balancing when vision is not available such as when eyes are closed and in the dark, visual  scanning; decreased foot clearance and smoothness of swing during ambulation.   Pt goals:  to improve balance  History of falls:    Last fall in Dec 2024. Does admit to LOB though unsure of frequency    Home Environment:  Lives at home with wife in a house - 6 [address] (rail). 11-12 steps to basement (rails). 1 story home.        Past medical history was reviewed with Liliya.    Liliya  has a past medical history of Essential hypertension. Hearing loss L unknown etiology. Osteoporosis   Past surgical history: kyphoplasty 2/2 traumatic spinal fx    ASSESSMENT  Liliya presents to physical therapy evaluation with reports of imbalance resulting in frequent LOB  with +hx of falls. Pt is unable to report today how frequently she loses her balance but reports imbalance/LOB when vision is not available to her such as in the shower (eyes closed) or walking in the dark. Of note she has significant hearing loss in the L ear of unknown etiology. Per [facility] reports central signs observed. Oculomotor exam today reveals gazed evoke nystagmus in room light with upward and L gaze- otherwise unremarkable exam. Fixation blocked testing will be completed next session as well as strength, coordination, and additional balance assessment. L carbon fiber AFO was trial in session today after extensive education about device. Pt reports feeling \"worse\" in device and does not wish to pursue it despite education on response observed by this author and time needed to adjust to this device. No foot drop however was observed without AFO though a slight foot slap is noted. Delayed swing and reduced push off noted at higher gait speed only. Pt is considered a fall risk based on FGA with significant difficulty with vertical head movements during ambulation and EC walking 2/2 imbalance/LOB. All pt questions answered today within scope of PT practice. She verbalizes understanding of all material taught.     Liliya would benefit from skilled Physical  Therapy that is medically necessary to improve gait and balance in order to reduce risk of future falls and maintain current level of independence.   OBJECTIVE:      Observation/Posture:   NAD    ROM and Strength:  TBA    Oculomotor exam  Spontaneous Nystagmus: absent in Room light -TBA fixation blocked   Smooth Pursuit: Negative  Saccades: Negative  Gaze Evoked Nystagmus: room light: Positive L beat nystagmus in L gaze and upward gaze only -TBA fixation blocked  Head Thrust: Negative  VOR screen: WNL, asymptomatic   VOR Cancellation: Negative   Convergence: Not Tested   Cover/Uncover: Negative   Cross Cover: Negative   Head Shaking Nystagmus: Not Tested   Dynamic Visual Acuity: TBA     Coordination: TBA    Sensation:      TBA       Postural Control: BBS TBA     Gait: pt ambulates on level ground independently without AD  Gait deviations: mildly guarded gait pattern, delayed swing and reduced push off L at higher speeds, mild foot slap L   Gait speed: 0.96 m/s    Timed Up and Go (AD,time): 13 seconds At risk of falls: Yes    5x Sit -->Stand: 16.2 seconds -poor eccentric control into chair     Functional Gait Assessment   Item Description Score (0 worst, 3 best)    ___2___1. Gait Level Surface-  Time: ___6.3___seconds  ___2___2. Change in gait speed  ___2___3. Gait with horizontal head turns   ___0___4. Gait with vertical head turns  ___1___5. Gait and pivot turn  ___3___6. Step over obstacle  ___1___7. Gait with narrow base of support-  # of steps_____5___  ___0___8. Gait with eyes closed-  Time:unable- postural instability with LOB  ___2___9. Ambulating backward  ___2___10. Steps    TOTAL SCORE: __15__/30    (less than 22/30 = fall risk)         Today's Treatment and Response:   Pt education was provided on exam findings and POC   Today's Treatment       4/2/2025   Neuro Treatment   Therapeutic Exercise Pt educated on carbon fiber AFO. Pt trialing carbon fiber PLS AFO L in session today ambulating with device.    Therapeutic Exercise Minutes 20   Evaluation Minutes 40   Total Time Of Timed Procedures 20   Total Time Of Service-Based Procedures 40   Total Treatment Time 60        Formal HEP to be initiated next session    Charges:  PT EVAL: High Complexity, 1 TE  In agreement with functional outcome measures and clinical rationale, this evaluation involved High Complexity decision making due to 3+ personal factors/comorbidities, 4+ body structures involved/activity limitations, and unstable symptoms including  varying levels of imbalance with +hx of falls -likely of mixed etiology .                                              PLAN OF CARE:    Goals: (to be met in 8 visits)   Pt to be independent in HEP   Pt will improve FGA by at least 5 points in order to reflect reduced risk of falls.   -Additional goals to be added as appropriate upon completion of testing-      Frequency / Duration: Patient will be seen 1-2x/week or a total of 8 visits over a 90 day period. Treatment will include: Gait training; Therapeutic Exercise; Home Exercise Program instruction; Neuromuscular Re-education; Self-Care Home Management; Therapeutic Activities; Patient/Family Education    Education or treatment limitation: None   Rehab Potential: good     Patient was advised of these findings, precautions, and treatment options and has agreed to actively participate in planning and for this course of care.    Thank you for your referral. Please co-sign or sign and return this letter via fax as soon as possible to [phone]. If you have any questions, please contact me at Dept: [phone]    Sincerely,  Electronically signed by therapist: Zeynep Hill, PT  Physician's certification required: Yes  I certify the need for these services furnished under this plan of treatment and while under my care.    X___________________________________________________ Date____________________    Certification From: 4/2/2025  To:7/1/2025

## 2025-04-04 ENCOUNTER — OFFICE VISIT (OUTPATIENT)
Dept: PHYSICAL THERAPY | Facility: HOSPITAL | Age: 65
End: 2025-04-04
Attending: PHYSICAL MEDICINE & REHABILITATION
Payer: COMMERCIAL

## 2025-04-04 PROCEDURE — 97110 THERAPEUTIC EXERCISES: CPT

## 2025-04-04 PROCEDURE — 97140 MANUAL THERAPY 1/> REGIONS: CPT

## 2025-04-04 PROCEDURE — 97530 THERAPEUTIC ACTIVITIES: CPT

## 2025-04-04 PROCEDURE — 97112 NEUROMUSCULAR REEDUCATION: CPT

## 2025-04-04 NOTE — PROGRESS NOTES
Patient: Liliya Stockton (64 year old, female) Referring Provider:  Insurance:   Diagnosis: disequilibrium, frequent falls Tamiko Cindyu  BCBS POS   Date of Surgery: - Next MD visit:  N/A   Precautions:  Fall Risk - Referral Information:    Date of Evaluation: Req: 0, Auth: 0, Exp:     04/02/25 POC Auth Visits:          Today's Date   4/4/2025    Subjective  Continues to feel stiff at end of the day.       Pain: 4/10     Objective  see flowsheet            Assessment  Patient tolerated light weight hopping on the leg press without issue.  Demonstrates improved movement patterns.  Continues to verbalize high fear avoidance behaviors.    Goals (to be met in 8 visits)        Plan  Continue with progressive core stabilization and balance.    Treatment Last 4 Visits  Treatment Day: 9       3/10/2025 3/14/2025 3/31/2025 4/4/2025   Spine Treatment   Therapeutic Exercise Swiss ball quadruped alternating leg extensions  Glute bridge  Standing row, staggered stance  Hooklying with perurbations Alternating LE extension, abdomen supported on swiss ball, 3 x 10  Cat/camel x 10  Standing rows, shoulder extension, 2 x 10, green   Alternating LE extension, abdomen supported on swiss ball, 3 x 10  Cat/camel x 10  Standing rows, shoulder extension, 2 x 10, green   DLP Shuttle, 75#  Alternating prone leg extension, 3 x 15  Standing rows, 2 x 20, blue       Neuro Re-Education Paloff press, standing, 3 x 15 each  Wall plank + rotation, 3 x 8 each   Clam shell with manual feedback for gluteal activation  SL hip abduction with manual feedback  L side sciatic nerve flossers, manual Clam shell with manual feedback for gluteal activation  SL hip abduction with manual feedback  L side sciatic nerve flossers, manual   Alternating UE reach in Quadruped with wall ball, 3 x 10  Tall kneel hip hinge, 2 x 30  Swiss ball lat pull down, 2 x 20, green   Manual Therapy R gluteals STM  Manual R gluteal stretch  STM to B QL Posterior pelvic  mobilization, R leg  STM to L hamstring and deep rotators Posterior innominate rotation, gr III, sustained, bilateral  STM to R psoas and B QL Posterior innominate rotation, gr III, sustained, bilateral  STM to L psoas and B QL     Therapeutic Activity Step up, with ab brace, 3 x 10  Squat on wall with ball, 2 x 20   Potty squats, 3 x 10'', 24'' box  Shortstop hip hinge, 3 x 10   Potty squats, 3 x 10'', 24'' box  Shortstop hip hinge, 3 x 10   25# hopping on Shuttle:  single leg 3 x 10, double leg with triple extension 3 x 6, alternating leg 2 x 8   Therapeutic Exercise Minutes 10 10 10 10   Neuro Re-Educ Minutes 10 10 10 10   Manual Therapy Minutes 10 10 10 10   Therapeutic Activity Minutes 10 10 10 10   Total Time Of Timed Procedures 40 40 40 40   Total Time Of Service-Based Procedures 0 0 0 0   Total Treatment Time 40 40 40 40        HEP       Charges  1 MT, TE, TA, NMR

## 2025-04-07 ENCOUNTER — OFFICE VISIT (OUTPATIENT)
Dept: PHYSICAL THERAPY | Facility: HOSPITAL | Age: 65
End: 2025-04-07
Attending: PHYSICAL MEDICINE & REHABILITATION
Payer: COMMERCIAL

## 2025-04-07 PROCEDURE — 97112 NEUROMUSCULAR REEDUCATION: CPT

## 2025-04-07 PROCEDURE — 97530 THERAPEUTIC ACTIVITIES: CPT

## 2025-04-07 PROCEDURE — 97110 THERAPEUTIC EXERCISES: CPT

## 2025-04-07 PROCEDURE — 97140 MANUAL THERAPY 1/> REGIONS: CPT

## 2025-04-09 ENCOUNTER — OFFICE VISIT (OUTPATIENT)
Dept: PHYSICAL THERAPY | Facility: HOSPITAL | Age: 65
End: 2025-04-09
Attending: PHYSICAL MEDICINE & REHABILITATION
Payer: COMMERCIAL

## 2025-04-09 PROCEDURE — 97112 NEUROMUSCULAR REEDUCATION: CPT

## 2025-04-09 NOTE — PROGRESS NOTES
Patient: Liliya Stockton (64 year old, female) Referring Provider:  Insurance:   Diagnosis: disequilibrium, frequent falls Tamiko Flood  BCBS POS   Date of Surgery: - Next MD visit:  N/A   Precautions:  Fall Risk - Referral Information:    Date of Evaluation: Req: 0, Auth: 0, Exp:     04/02/25 POC Auth Visits:         Discharge Summary  Pt has attended 10 visits in Physical Therapy.     Today's Date   4/7/2025    Subjective  Continues to feel stiff at end of the day.  However, feels less stiffness overall.  Reports 50% improvement in symptoms since starting PT.       Pain: 4/10     Objective  see flowsheet       Trunk  , Hip       2/17/2025 4/9/2025   Hip ROM/MMT   Rt Hip Flexion MMT (L2) 5 5   Lt Hip Flexion MMT (L2) 4 5   Rt Hip Extension MMT 4 5   Lt Hip Extension MMT 4 5   Rt Hip Abduction MMT 4 5   Lt Hip Abduction MMT 3+ 5   Rt Hip ER MMT 4 5   Lt Hip ER MMT 3+ 5   Rt Hip IR MMT 5 5   Lt Hip IR MMT 4 5   , Knee       2/17/2025 4/9/2025   Knee ROM/MMT   Rt Knee Flexion MMT  5   Lt Knee Flexion MMT  5   Rt Knee Extension MMT (L3) 4+ 5   Lt Knee Extension MMT (L3) 4 5        Assessment  Has made significant gains in movement quality.  Is appropriate to discharge from orthopedic PT to focus on neurological PT for balance training to avoid falls.       Plan  Discharge.    Treatment Last 4 Visits  Treatment Day: 10       3/14/2025 3/31/2025 4/4/2025 4/7/2025   Spine Treatment   Therapeutic Exercise Alternating LE extension, abdomen supported on swiss ball, 3 x 10  Cat/camel x 10  Standing rows, shoulder extension, 2 x 10, green   Alternating LE extension, abdomen supported on swiss ball, 3 x 10  Cat/camel x 10  Standing rows, shoulder extension, 2 x 10, green   DLP Shuttle, 75#  Alternating prone leg extension, 3 x 15  Standing rows, 2 x 20, blue     DLP Shuttle, 75#  Alternating prone leg extension, 3 x 15  Standing rows, 2 x 20, blue     Neuro Re-Education Clam shell with manual feedback for gluteal  activation  SL hip abduction with manual feedback  L side sciatic nerve flossers, manual Clam shell with manual feedback for gluteal activation  SL hip abduction with manual feedback  L side sciatic nerve flossers, manual   Alternating UE reach in Quadruped with wall ball, 3 x 10  Tall kneel hip hinge, 2 x 30  Swiss ball lat pull down, 2 x 20, green Alternating UE reach in Quadruped with wall ball, 3 x 10  Tall kneel hip hinge, 2 x 30  Swiss ball lat pull down, 2 x 20, green     Manual Therapy Posterior pelvic mobilization, R leg  STM to L hamstring and deep rotators Posterior innominate rotation, gr III, sustained, bilateral  STM to R psoas and B QL Posterior innominate rotation, gr III, sustained, bilateral  STM to L psoas and B QL   Posterior innominate rotation, gr III, sustained, bilateral  STM to L psoas and B QL     Therapeutic Activity Potty squats, 3 x 10'', 24'' box  Shortstop hip hinge, 3 x 10   Potty squats, 3 x 10'', 24'' box  Shortstop hip hinge, 3 x 10   25# hopping on Shuttle:  single leg 3 x 10, double leg with triple extension 3 x 6, alternating leg 2 x 8 25# hopping on Shuttle:  single leg 3 x 10, double leg with triple extension 3 x 6, alternating leg 2 x 8   Therapeutic Exercise Minutes 10 10 10 10   Neuro Re-Educ Minutes 10 10 10 10   Manual Therapy Minutes 10 10 10 10   Therapeutic Activity Minutes 10 10 10 10   Total Time Of Timed Procedures 40 40 40 40   Total Time Of Service-Based Procedures 0 0 0 0   Total Treatment Time 40 40 40 40        HEP       Charges  1 MT, TE, TA, NMR

## 2025-04-09 NOTE — PATIENT INSTRUCTIONS
Do these exercises daily.     Stand and hold a target (star on stick) at arms length in front of your eyes.  Keep eyes focused on star.  Turn eyes, head and target together left and right.  Do 5-10 turns, then repeat in up and down direction, 5-10 turns. Use a plan backdrop when performing.       Stand up tall with hands on wall and feet firmly on ground. Close eyes and pay attention to feeling of hands and feet on support surfaces. Push the wall away through your arms. With your eyes closed, lift one hand off the wall. Return hand to wall and repeat with the other arm. Alternate until you have performed 10 on each side. Eyes closed the entire time.

## 2025-04-09 NOTE — PROGRESS NOTES
Patient: Liliya Stockton (64 year old, female) Referring Provider:  Insurance:   Diagnosis: disequilibrium, frequent falls Tamiko Flood  BCBS POS   Date of Surgery: - Next MD visit:  N/A   Precautions:  Fall Risk - Referral Information:    Date of Evaluation: Req: 0, Auth: 0, Exp:     25 POC Auth Visits:  8 ((no auth req))       Today's Date   2025    Subjective   No new information to report.        Pain: 0/10     Objective          CAMILLE  (scale for the assessment and rating of ataxia)   Gait: 2 clearly abnormal, tandem walking >10 steps not possible   Stance: 1 able to stand with feet together without sway but not tandem for >10 seconds   Sittin normal   Speech:0 normal   Finger brenda: 0 B no dysmetria   Nose finger test: R 0 no tremor; L 1 tremor with amp >2CM  Fast alt hand movements: 1 B -slightly irregular   Heel-shin slide: 0 B normal     Mini-BEST  1. SIT TO STAND  _2___  2. RISE TO TOES __1__  3. STAND ON ONE LEG __2__  Left: Time in Seconds Trial 1:__7___Trial 2:__20___  Right: Time in Seconds Trial 1:__5___Trial 2:__20___  **To score each side separately use the trial with the longest time.  **To calculate thetotal score use the side [left or right] with the lowest numerical score [i.e. the worse side].    4. COMPENSATORY STEPPING CORRECTION- FORWARD __1__  5. COMPENSATORY STEPPING CORRECTION- BACKWARD __1__  6. COMPENSATORY STEPPING CORRECTION- LATERAL __1__  Left __1__    Right __1__  **Use the side with the lowest score to calculate sub-score total score.    7. STANCE (FEET TOGETHER); EYES OPEN, FIRM SURFACE __2__   Time in seconds:__30______  8. STANCE (FEET TOGETHER); EYES CLOSED, FOAM SURFACE __1__-increased postural sway   Time in seconds:____30____  9. INCLINE- EYES CLOSED __1__   Time in seconds:___3_____  10. CHANGE IN GAIT SPEED __1__  11. WALK WITH HEAD TURNS - HORIZONTAL _1___  12. WALK WITH PIVOT TURNS _1___  13. STEP OVER OBSTACLES _2___    14. TIMED UP & GO WITH DUAL TASK  [3 METER WALK] __0__-stops counting    TUG: __10.1___seconds; Dual Task TUG: ___13.8___seconds  **When scoring item 14, if subject’s gait speed slows more than 10% between the TUG without and with a Dual Task the score  should be decreased by a point.    TOTAL SCORE:  __17___/28    Romberg EC 30 seconds (increased postural sway but slow and higher amplitude)  Romberg EO on foam: 30 seconds   Tandem EO: L foot back 10 sec, R foot back 30 sec          Assessment   Pt demo's understanding of material taught today. Considerable difficulty with balance with EC especially on an slight incline (Attempts to align with surface and not gravity). Demo's good stepping strategy for balance reactions though does require more than a couple steps to catch self- no external support required. Demo's some self limiting behaviors but will eventually perform/attempt with encouragement. Session tolerated well.     Goals (to be met in 8 visits)   Pt to be independent in HEP   Pt will improve FGA by at least 5 points in order to reflect reduced risk of falls.   3.  Pt will improve MiniBEST by at least 5 points to reflect improved postural stability with standing ADL's.        Plan  MMT, sensation, continue to develop HEP    Treatment Last 4 Visits  Treatment Day: 2       4/2/2025 4/9/2025   Neuro Treatment   Therapeutic Exercise Pt educated on carbon fiber AFO. Pt trialing carbon fiber PLS AFO L in session today ambulating with device.    Neuro Re-Education  CAMILLE completed see objective section   MiniBEST completed see objective section   Hands on wall EC alt UE lifts (grounding) demo with return demo x10 B   Standing unsupported VOR cxl horizontal x5 (+nausea), vertical x5 (nausea) -demo with return demo    Therapeutic Exercise Minutes 20    Neuro Re-Educ Minutes  40   Evaluation Minutes 40    Total Time Of Timed Procedures 20 40   Total Time Of Service-Based Procedures 40 0   Total Treatment Time 60 40        Patient education    HEP  initiated -see pt instructions     Charges  3 NMR

## 2025-04-10 ENCOUNTER — TELEPHONE (OUTPATIENT)
Dept: PHYSICAL THERAPY | Facility: HOSPITAL | Age: 65
End: 2025-04-10

## 2025-04-11 ENCOUNTER — APPOINTMENT (OUTPATIENT)
Dept: PHYSICAL THERAPY | Facility: HOSPITAL | Age: 65
End: 2025-04-11
Attending: PHYSICAL MEDICINE & REHABILITATION
Payer: COMMERCIAL

## 2025-04-14 ENCOUNTER — OFFICE VISIT (OUTPATIENT)
Dept: PHYSICAL THERAPY | Facility: HOSPITAL | Age: 65
End: 2025-04-14
Attending: PHYSICAL MEDICINE & REHABILITATION
Payer: COMMERCIAL

## 2025-04-14 PROCEDURE — 97112 NEUROMUSCULAR REEDUCATION: CPT

## 2025-04-14 NOTE — PROGRESS NOTES
Patient: Liliya Stockton (64 year old, female) Referring Provider:  Insurance:   Diagnosis: disequilibrium, frequent falls Tamikogonzalo Flood  BCBS POS   Date of Surgery: - Next MD visit:  N/A   Precautions:  Fall Risk - Referral Information:    Date of Evaluation: Req: 0, Auth: 0, Exp:     04/02/25 POC Auth Visits:  8 ((no auth req))       Today's Date   4/14/2025    Subjective  Reports discomfort/pain after last session which she attributes to postural stability reaction testing. Not comfortable with external perturbations due to this response. Reports pain/stiffness that worsens throughout day.       Pain: 0/10     Objective  denies numbness and tingling        Assessment   Pt verbalized and demo'd understanding of material taught today. All exercises tolerated in session without reports of pain/discomfort. Challenged by exercises but performed without over LOB.     Goals (to be met in 8 visits)   Pt to be independent in HEP   Pt will improve FGA by at least 5 points in order to reflect reduced risk of falls.   3.  Pt will improve MiniBEST by at least 5 points to reflect improved postural stability with standing ADL's.          Plan  in future sessions: shuttle, rebounder, foam, obstacle negotiation, sliders    Treatment Last 4 Visits  Treatment Day: 3       4/2/2025 4/9/2025 4/14/2025   Neuro Treatment   Therapeutic Exercise Pt educated on carbon fiber AFO. Pt trialing carbon fiber PLS AFO L in session today ambulating with device.     Neuro Re-Education  CAMILLE completed see objective section   MiniBEST completed see objective section   Hands on wall EC alt UE lifts (grounding) demo with return demo x10 B   Standing unsupported VOR cxl horizontal x5 (+nausea), vertical x5 (nausea) -demo with return demo  Discussed rationale for challenging balance including perturbations given falling hx. Explained may have discomfort/soreness in core/spinal musculature when functionally challenging muscles needed for postural  stability. POC re: balance training explained.   Hands on wall EC alt UE lifts - demo with return demo x10-12 B (grounding) -rationale for exercise explained to patient including role of sensory re-weighting   Step stance EO with arm swing (perturbation, internal) -demo with return demo x30 sec ea foot in front - rationale explained   Fwd step with UE \"push\" (Ant wt shift) demo with return demo x10-12 B   Anti rotations red TB with UE ABC's - demo with return demo - A-Z performed B (perturbations, internal) -->yellow TB 4-5 reps ea VC's to coordinate breathing    Therapeutic Exercise Minutes 20     Neuro Re-Educ Minutes  40 42   Evaluation Minutes 40     Total Time Of Timed Procedures 20 40 42   Total Time Of Service-Based Procedures 40 0 0   Total Treatment Time 60 40 42        Patient education   As above in flow. HEP updated and issued, see pt instructions.     Charges  3 NMR

## 2025-04-14 NOTE — PATIENT INSTRUCTIONS
Do these exercises daily.     Stand and hold a target (star on stick) at arms length in front of your eyes.  Keep eyes focused on star.  Turn eyes, head and target together left and right.  Do 5-10 turns, then repeat in up and down direction, 5-10 turns. Use a plan backdrop when performing.       Stand up tall with hands on wall and feet firmly on ground. Close eyes and pay attention to feeling of hands and feet on support surfaces. Push the wall away through your arms. With your eyes closed, lift one hand off the wall. Return hand to wall and repeat with the other arm. Alternate until you have performed 10 on each side. Eyes closed the entire time.     Stand next to couch or countertop for safety. Take a step forward with one foot (not too big). Keep feet in this position as you swing arms for 30 seconds, practice with each foot in front.     Stand next to couch or countertop for safety. Take a step forward with one foot (not too big) as you push forward with your arms. The heel of your back foot will lift and the front foot will stay flat on floor. 10 times with each foot.     Close knotted end of band in a door at waist level. Hold band in both hands and pull so band is out in front of you. Move arms to write the ABC's. Do A-Z facing each direction.

## 2025-04-16 ENCOUNTER — OFFICE VISIT (OUTPATIENT)
Dept: PHYSICAL THERAPY | Facility: HOSPITAL | Age: 65
End: 2025-04-16
Attending: PHYSICAL MEDICINE & REHABILITATION
Payer: COMMERCIAL

## 2025-04-16 PROCEDURE — 97112 NEUROMUSCULAR REEDUCATION: CPT

## 2025-04-16 PROCEDURE — 97110 THERAPEUTIC EXERCISES: CPT

## 2025-04-16 NOTE — PROGRESS NOTES
Patient: Liliya Stockton (64 year old, female) Referring Provider:  Insurance:   Diagnosis: disequilibrium, frequent falls Tamiko Cindyu  BCBS POS   Date of Surgery: - Next MD visit:  N/A   Precautions:  Fall Risk - Referral Information:    Date of Evaluation: Req: 0, Auth: 0, Exp:     04/02/25 POC Auth Visits:  8 ((no auth req))       Today's Date   4/16/2025    Subjective  Reports compliance to HEP.       Pain: 4/10 (low back)     Objective  see flowsheet below            Assessment   Generally good postural stability throughout without overt LOB. Intermittent rest breaks provided.     Goals (to be met in 8 visits)   Pt to be independent in HEP   Pt will improve FGA by at least 5 points in order to reflect reduced risk of falls.   3.  Pt will improve MiniBEST by at least 5 points to reflect improved postural stability with standing ADL's.            Plan  in future sessions: shuttle, rebounder, foam, obstacle negotiation, sliders    Treatment Last 4 Visits  Treatment Day: 4 4/2/2025 4/9/2025 4/14/2025 4/16/2025   Neuro Treatment   Therapeutic Exercise Pt educated on carbon fiber AFO. Pt trialing carbon fiber PLS AFO L in session today ambulating with device.   Verbal review of HEP  Seated unsupported on SB  -pelvic tilts A<>P, R<>L x1 min ea   Iliacus release with DB press against wall 1 min- demo with return demo  SB behind back- mini squat x15 - demo with return demo--> dual motor task (2# bicep curls) x15- demo with return demo   Hooklying feet on dynadisc -arms crossed mini bridge 2x10   Neuro Re-Education  CAMILLE completed see objective section   MiniBEST completed see objective section   Hands on wall EC alt UE lifts (grounding) demo with return demo x10 B   Standing unsupported VOR cxl horizontal x5 (+nausea), vertical x5 (nausea) -demo with return demo  Discussed rationale for challenging balance including perturbations given falling hx. Explained may have discomfort/soreness in core/spinal  musculature when functionally challenging muscles needed for postural stability. POC re: balance training explained.   Hands on wall EC alt UE lifts - demo with return demo x10-12 B (grounding) -rationale for exercise explained to patient including role of sensory re-weighting   Step stance EO with arm swing (perturbation, internal) -demo with return demo x30 sec ea foot in front - rationale explained   Fwd step with UE \"push\" (Ant wt shift) demo with return demo x10-12 B   Anti rotations red TB with UE ABC's - demo with return demo - A-Z performed B (perturbations, internal) -->yellow TB 4-5 reps ea VC's to coordinate breathing  Seated unsupported on SB   -upper trunk rotations x8 B   -alt reach down across midline to touch foot x10 B      Rebounder: pt selected INGRID   2# x15-->4# x15   Standing pt selected INGRID EC scap retraction red TB x15 (grounding) demo with return demo   Step stance with body blade (perturbations) shoulder flex to 90 x10 ea foot in front   Tap ups cone  -x10 B  -2 consecutive with dynamic LE movement -x10 B focus on reduced reliance on vision for balance      Therapeutic Exercise Minutes 20   15   Neuro Re-Educ Minutes  40 42 25   Evaluation Minutes 40      Total Time Of Timed Procedures 20 40 42 40   Total Time Of Service-Based Procedures 40 0 0 0   Total Treatment Time 60 40 42 40        Patient education   Encouraged continued performance of HEP.     Charges  1 TE, 2 NMR

## 2025-04-21 ENCOUNTER — OFFICE VISIT (OUTPATIENT)
Dept: PHYSICAL THERAPY | Facility: HOSPITAL | Age: 65
End: 2025-04-21
Attending: PHYSICAL MEDICINE & REHABILITATION
Payer: COMMERCIAL

## 2025-04-21 PROCEDURE — 97112 NEUROMUSCULAR REEDUCATION: CPT

## 2025-04-21 NOTE — PROGRESS NOTES
Patient: Liliya Stockton (64 year old, female) Referring Provider:  Insurance:   Diagnosis: disequilibrium, frequent falls Tamikogonzalo Whiteu  BCBS POS   Date of Surgery: - Next MD visit:  N/A   Precautions:  Fall Risk - Referral Information:    Date of Evaluation: Req: 0, Auth: 0, Exp:     04/02/25 POC Auth Visits:  8 ((no auth req))       Today's Date   4/21/2025    Subjective   Slept on air mattress over the weekend.        Pain: 4/10 (L hip and low back)     Objective     See flowsheet below.         Assessment   Generally good postural stability throughout without overt LOB.     Goals (to be met in 8 visits)   Pt to be independent in HEP   Pt will improve FGA by at least 5 points in order to reflect reduced risk of falls.   3.  Pt will improve MiniBEST by at least 5 points to reflect improved postural stability with standing ADL's.              Plan  in future sessions: shuttle, foam, obstacle negotiation, sliders    Treatment Last 4 Visits  Treatment Day: 5       4/9/2025 4/14/2025 4/16/2025 4/21/2025   Neuro Treatment   Therapeutic Exercise   Verbal review of HEP  Seated unsupported on SB  -pelvic tilts A<>P, R<>L x1 min ea   Iliacus release with DB press against wall 1 min- demo with return demo  SB behind back- mini squat x15 - demo with return demo--> dual motor task (2# bicep curls) x15- demo with return demo   Hooklying feet on dynadisc -arms crossed mini bridge 2x10    Neuro Re-Education CAMILLE completed see objective section   MiniBEST completed see objective section   Hands on wall EC alt UE lifts (grounding) demo with return demo x10 B   Standing unsupported VOR cxl horizontal x5 (+nausea), vertical x5 (nausea) -demo with return demo  Discussed rationale for challenging balance including perturbations given falling hx. Explained may have discomfort/soreness in core/spinal musculature when functionally challenging muscles needed for postural stability. POC re: balance training explained.   Hands on wall EC  alt UE lifts - demo with return demo x10-12 B (grounding) -rationale for exercise explained to patient including role of sensory re-weighting   Step stance EO with arm swing (perturbation, internal) -demo with return demo x30 sec ea foot in front - rationale explained   Fwd step with UE \"push\" (Ant wt shift) demo with return demo x10-12 B   Anti rotations red TB with UE ABC's - demo with return demo - A-Z performed B (perturbations, internal) -->yellow TB 4-5 reps ea VC's to coordinate breathing  Seated unsupported on SB   -upper trunk rotations x8 B   -alt reach down across midline to touch foot x10 B      Rebounder: pt selected INGRID   2# x15-->4# x15   Standing pt selected INGRID EC scap retraction red TB x15 (grounding) demo with return demo   Step stance with body blade (perturbations) shoulder flex to 90 x10 ea foot in front   Tap ups cone  -x10 B  -2 consecutive with dynamic LE movement -x10 B focus on reduced reliance on vision for balance    Nu-step level 3 - motor priming coordination- x10 mins   Rebounder:   -pt selected INGRID 4# x15   -step stance x10 ea foot in front   Ambulation over hurdles (short x6)  -fwd ambulation 4 bouts -supervision   -side stepping 2 bouts ea direction   Ambulation slow<>fast on command, quick stops ~60 ft x6 bouts SBA  Tap ups lighted targets -performed B, multiple reps, 2 bouts   Bending down<>standing upright to touch lighted target with hand multiple reps, 2 bouts          Therapeutic Exercise Minutes   15    Neuro Re-Educ Minutes 40 42 25 38   Total Time Of Timed Procedures 40 42 40 38   Total Time Of Service-Based Procedures 0 0 0 0   Total Treatment Time 40 42 40 38        Patient education   Encouraged continued performance of HEP.     Charges  3 NMR

## 2025-04-23 ENCOUNTER — OFFICE VISIT (OUTPATIENT)
Dept: PHYSICAL THERAPY | Facility: HOSPITAL | Age: 65
End: 2025-04-23
Attending: PHYSICAL MEDICINE & REHABILITATION
Payer: COMMERCIAL

## 2025-04-23 PROCEDURE — 97112 NEUROMUSCULAR REEDUCATION: CPT

## 2025-04-23 NOTE — PROGRESS NOTES
Patient: Liliya Stockton (64 year old, female) Referring Provider:  Insurance:   Diagnosis: disequilibrium, frequent falls Tamiko Cindyu  BCBS POS   Date of Surgery: - Next MD visit:  N/A   Precautions:  Fall Risk - Referral Information:    Date of Evaluation: Req: 0, Auth: 0, Exp:     04/02/25 POC Auth Visits:  8 ((no auth req))       Today's Date   4/23/2025    Subjective  Reports losing HEP handout - so unable to perform.       Pain: 7/10 (low back)     Objective  see flowsheet below          Assessment   Generally good postural stability throughout despite increased challenge of balance tasks.     Goals (to be met in 8 visits)   Pt to be independent in HEP   Pt will improve FGA by at least 5 points in order to reflect reduced risk of falls.   3.  Pt will improve MiniBEST by at least 5 points to reflect improved postural stability with standing ADL's.                Plan  in future sessions: shuttle, foam, obstacle negotiation    Treatment Last 4 Visits  Treatment Day: 6       4/14/2025 4/16/2025 4/21/2025 4/23/2025   Neuro Treatment   Therapeutic Exercise  Verbal review of HEP  Seated unsupported on SB  -pelvic tilts A<>P, R<>L x1 min ea   Iliacus release with DB press against wall 1 min- demo with return demo  SB behind back- mini squat x15 - demo with return demo--> dual motor task (2# bicep curls) x15- demo with return demo   Hooklying feet on dynadisc -arms crossed mini bridge 2x10     Neuro Re-Education Discussed rationale for challenging balance including perturbations given falling hx. Explained may have discomfort/soreness in core/spinal musculature when functionally challenging muscles needed for postural stability. POC re: balance training explained.   Hands on wall EC alt UE lifts - demo with return demo x10-12 B (grounding) -rationale for exercise explained to patient including role of sensory re-weighting   Step stance EO with arm swing (perturbation, internal) -demo with return demo x30 sec ea  foot in front - rationale explained   Fwd step with UE \"push\" (Ant wt shift) demo with return demo x10-12 B   Anti rotations red TB with UE ABC's - demo with return demo - A-Z performed B (perturbations, internal) -->yellow TB 4-5 reps ea VC's to coordinate breathing  Seated unsupported on SB   -upper trunk rotations x8 B   -alt reach down across midline to touch foot x10 B      Rebounder: pt selected INGRID   2# x15-->4# x15   Standing pt selected INGRID EC scap retraction red TB x15 (grounding) demo with return demo   Step stance with body blade (perturbations) shoulder flex to 90 x10 ea foot in front   Tap ups cone  -x10 B  -2 consecutive with dynamic LE movement -x10 B focus on reduced reliance on vision for balance    Nu-step level 3 - motor priming coordination- x10 mins   Rebounder:   -pt selected INGRID 4# x15   -step stance x10 ea foot in front   Ambulation over hurdles (short x6)  -fwd ambulation 4 bouts -supervision   -side stepping 2 bouts ea direction   Ambulation slow<>fast on command, quick stops ~60 ft x6 bouts SBA  Tap ups lighted targets -performed B, multiple reps, 2 bouts   Bending down<>standing upright to touch lighted target with hand multiple reps, 2 bouts        Standing on foam- tap up 6 in step x15 B -supervision   Rows green TB EC x15 (Grounding)   Perturbation training- fitness slider   -fwd x15-20 B   -lateral x15-20 B   -backwards x15-20 B   Ambulation stepping over hurdles (high, 6) 6 bouts -supervision   Seated unsupported on dynadisc (feet off of floor) - visual scanning task to touch lighted target with hands- 2 bouts, multiple reps   1 foot on bosu ball- fwd, backwards shuffles to lighted target, bending down to touch with hand 2 bouts ea foot on ball   Lateral, quick shuffles to lighted target, tap with foot then squat hold- 2 bouts, multiple reps    Therapeutic Exercise Minutes  15     Neuro Re-Educ Minutes 42 25 38 40   Total Time Of Timed Procedures 42 40 38 40   Total Time Of  Service-Based Procedures 0 0 0 0   Total Treatment Time 42 40 38 40        Patient education   Encouraged continued performance of HEP.     Charges  3 NMR

## 2025-04-28 ENCOUNTER — OFFICE VISIT (OUTPATIENT)
Dept: PHYSICAL THERAPY | Facility: HOSPITAL | Age: 65
End: 2025-04-28
Attending: PHYSICAL MEDICINE & REHABILITATION
Payer: COMMERCIAL

## 2025-04-28 PROCEDURE — 97112 NEUROMUSCULAR REEDUCATION: CPT

## 2025-04-28 NOTE — PROGRESS NOTES
Patient: Liliya Stockton (64 year old, female) Referring Provider:  Insurance:   Diagnosis: disequilibrium, frequent falls Tamikogonzalo Whiteu  BCBS POS   Date of Surgery: - Next MD visit:  N/A   Precautions:  Fall Risk - Referral Information:    Date of Evaluation: Req: 0, Auth: 0, Exp:     04/02/25 POC Auth Visits:  8 ((no auth req))       Today's Date   4/28/2025    Subjective  No new information to report.        Pain: 4/10 (L hip/back)     Objective     See flowsheet below         Assessment   Demo's understanding of exercises provided for home. Good postural stability throughout.     Goals (to be met in 8 visits)   Pt to be independent in HEP   Pt will improve FGA by at least 5 points in order to reflect reduced risk of falls.   3.  Pt will improve MiniBEST by at least 5 points to reflect improved postural stability with standing ADL's.                  Plan  reassess    Treatment Last 4 Visits  Treatment Day: 7 4/16/2025 4/21/2025 4/23/2025 4/28/2025   Neuro Treatment   Therapeutic Exercise Verbal review of HEP  Seated unsupported on SB  -pelvic tilts A<>P, R<>L x1 min ea   Iliacus release with DB press against wall 1 min- demo with return demo  SB behind back- mini squat x15 - demo with return demo--> dual motor task (2# bicep curls) x15- demo with return demo   Hooklying feet on dynadisc -arms crossed mini bridge 2x10      Neuro Re-Education Seated unsupported on SB   -upper trunk rotations x8 B   -alt reach down across midline to touch foot x10 B      Rebounder: pt selected INGRID   2# x15-->4# x15   Standing pt selected INGRID EC scap retraction red TB x15 (grounding) demo with return demo   Step stance with body blade (perturbations) shoulder flex to 90 x10 ea foot in front   Tap ups cone  -x10 B  -2 consecutive with dynamic LE movement -x10 B focus on reduced reliance on vision for balance    Nu-step level 3 - motor priming coordination- x10 mins   Rebounder:   -pt selected INGRID 4# x15   -step stance x10 ea  foot in front   Ambulation over hurdles (short x6)  -fwd ambulation 4 bouts -supervision   -side stepping 2 bouts ea direction   Ambulation slow<>fast on command, quick stops ~60 ft x6 bouts SBA  Tap ups lighted targets -performed B, multiple reps, 2 bouts   Bending down<>standing upright to touch lighted target with hand multiple reps, 2 bouts        Standing on foam- tap up 6 in step x15 B -supervision   Rows green TB EC x15 (Grounding)   Perturbation training- fitness slider   -fwd x15-20 B   -lateral x15-20 B   -backwards x15-20 B   Ambulation stepping over hurdles (high, 6) 6 bouts -supervision   Seated unsupported on dynadisc (feet off of floor) - visual scanning task to touch lighted target with hands- 2 bouts, multiple reps   1 foot on bosu ball- fwd, backwards shuffles to lighted target, bending down to touch with hand 2 bouts ea foot on ball   Lateral, quick shuffles to lighted target, tap with foot then squat hold- 2 bouts, multiple reps  Verbal review of previously provided HEP  Pt selected INGRID - EC, 2 bouts max hold (30 sec) VC's for grounding and postural corrective movements   Step stance EO with arm swing 30 sec ea foot in front   Fwd step 1 foot with UE \"push\" ant wt shift x10 B  Ambulation outdoors with horizontal head turns ~60 ft x2   PT selected INGRID alt trunk rotation -demo with return demo x10 B   Rebounder 4#  -pt selected INGRID x15  -small step stance x10 ea foot in front   Step outs all directions over obstacle (Stick) following commands on screen -multiple reps, 2 bouts   Step outs all directions to cones with dual task (cog, recall) following commands on screen -multiple reps. 2 bouts   Walking changing speeds on command including quick stops ~60 ft x4    Therapeutic Exercise Minutes 15      Neuro Re-Educ Minutes 25 38 40 40   Total Time Of Timed Procedures 40 38 40 40   Total Time Of Service-Based Procedures 0 0 0 0   Total Treatment Time 40 38 40 40        Patient education   HEP updated  -see pt instructions. POC discussed. Discussed importance of head movement and activity post surgically within ability for safety.     Charges  3 NMR

## 2025-04-28 NOTE — PATIENT INSTRUCTIONS
[employer] classes good for balance, coordination and functional strength.     Do these exercises daily.     Stand and hold a target (star on stick) at arms length in front of your eyes.  Keep eyes focused on star.  Turn eyes, head and target together left and right.  Do 5-10 turns, then repeat in up and down direction, 5-10 turns. Use a plan backdrop when performing.       Stand with bed or couch behind you for safety (do not lean against it). Feet comfortable distance apart. Arms crossed. Close your eyes. Push all 4 corners of your feet into the floor. Pay attention to feeling of feet on floor. Balance for 30 seconds. It is normal to feel wobbly.      Stand next to couch or countertop for safety. Take a step forward with one foot (not too big). Keep feet in this position as you swing arms for 30 seconds, practice with each foot in front.     Stand next to couch or countertop for safety. Take a step forward with one foot (not too big) as you push forward with your arms. The heel of your back foot will lift and the front foot will stay flat on floor. 10 times with each foot.     Close knotted end of band in a door at waist level. Hold band in both hands and pull so band is out in front of you. Move arms to write the ABC's. Do A-Z facing each direction.      6. Walk and turn your head side to side to look at the walls as if window shopping.  Turn head every 4-5 steps and try to maintain a straight path.  Do length of walkway twice.     7. Stand with your bed or couch behind you for safety (do not lean against it). Feet comfortable distance apart. Reach across body turning head and body. Return to center, pause and repeat with the other hand reaching across body. Alternate until you have done 10 each direction. Move slow and gentle.

## 2025-04-30 ENCOUNTER — OFFICE VISIT (OUTPATIENT)
Dept: PHYSICAL THERAPY | Facility: HOSPITAL | Age: 65
End: 2025-04-30
Attending: PHYSICAL MEDICINE & REHABILITATION
Payer: COMMERCIAL

## 2025-04-30 PROCEDURE — 97112 NEUROMUSCULAR REEDUCATION: CPT

## 2025-04-30 NOTE — PROGRESS NOTES
Patient: Liliya Stockton (64 year old, female) Referring Provider:  Insurance:   Diagnosis: disequilibrium, frequent falls Tamiko Flood  BCBS POS   Date of Surgery: - Next MD visit:  N/A   Precautions:  Fall Risk - Referral Information:    Date of Evaluation: Req: 0, Auth: 0, Exp:     04/02/25 POC Auth Visits:  8 ((no auth req))         Discharge Summary    Pt has attended 8 visits in Physical Therapy.     Today's Date   4/30/2025    Subjective   Reports noting improvements in balance with therapy. She also reports feeling anxious about her upcoming cochlear implant- concerned about the risk of developing vertigo.        Pain: 6/10 (low back, L hip)     Assessment: Pt has made significant improvements in FGA and miniBEST since initial evaluation. Is no longer considered a fall risk based on FGA. Improvements in EC balance both statically and dynamically in standing are noted. Compensatory stepping strategies are also improved. She verbalizes understanding of material taught today. All goals have been met and she is to be discharged at this time.     Objective       *values from initial testing documented in parenthesis below    Functional Gait Assessment   Item Description Score (0 worst, 3 best)    ___3___1. Gait Level Surface-  Time: ___5.5 (6.3)___seconds  ___3___2. Change in gait speed  ___2___3. Gait with horizontal head turns   ___2___4. Gait with vertical head turns  ___3___5. Gait and pivot turn  ___3___6. Step over obstacle  ___1___7. Gait with narrow base of support-  # of steps_____5 (unchanged)___  ___1___8. Gait with eyes closed-  Time:16 seconds (unable- postural instability with LOB)  ___3___9. Ambulating backward  ___2___10. Steps    TOTAL SCORE: __23 (15)__/30    (less than 22/30 = fall risk)     Mini-BEST  1. SIT TO STAND  _2___  2. RISE TO TOES __2__  3. STAND ON ONE LEG __1__  Left: Time in Seconds Trial 1:__14 (7)___Trial 2:__16 (20)___  Right: Time in Seconds Trial 1:__20 (5)___Trial 2:__x  (20)___  **To score each side separately use the trial with the longest time.  **To calculate thetotal score use the side [left or right] with the lowest numerical score [i.e. the worse side].    4. COMPENSATORY STEPPING CORRECTION- FORWARD __2__  5. COMPENSATORY STEPPING CORRECTION- BACKWARD __1__  6. COMPENSATORY STEPPING CORRECTION- LATERAL __2__  Left _2__    Right __2__  **Use the side with the lowest score to calculate sub-score total score.    7. STANCE (FEET TOGETHER); EYES OPEN, FIRM SURFACE __2__   Time in seconds:__30______  8. STANCE (FEET TOGETHER); EYES CLOSED, FOAM SURFACE __1__-increased postural sway   Time in seconds:____30____  9. INCLINE- EYES CLOSED __2__   Time in seconds:___30 (3)_____  10. CHANGE IN GAIT SPEED __2__  11. WALK WITH HEAD TURNS - HORIZONTAL _1___  12. WALK WITH PIVOT TURNS _1___  13. STEP OVER OBSTACLES _2___    14. TIMED UP & GO WITH DUAL TASK [3 METER WALK] __1__    TUG: __9 (10.1)___seconds; Dual Task TUG: ___11 (13.8)___seconds  **When scoring item 14, if subject’s gait speed slows more than 10% between the TUG without and with a Dual Task the score  should be decreased by a point.    TOTAL SCORE:  __22 (17)___/28    Romberg EC 30 seconds -postural sway WNL  Romberg EO on foam: 30 seconds             Goals (to be met in 8 visits)   Pt to be independent in HEP Met   Pt will improve FGA by at least 5 points in order to reflect reduced risk of falls. Met   3.  Pt will improve MiniBEST by at least 5 points to reflect improved postural stability with standing ADL's. Met       Treatment Last 4 Visits  Treatment Day: 8       4/21/2025 4/23/2025 4/28/2025 4/30/2025   Neuro Treatment   Neuro Re-Education Nu-step level 3 - motor priming coordination- x10 mins   Rebounder:   -pt selected INGRID 4# x15   -step stance x10 ea foot in front   Ambulation over hurdles (short x6)  -fwd ambulation 4 bouts -supervision   -side stepping 2 bouts ea direction   Ambulation slow<>fast on command, quick  stops ~60 ft x6 bouts SBA  Tap ups lighted targets -performed B, multiple reps, 2 bouts   Bending down<>standing upright to touch lighted target with hand multiple reps, 2 bouts        Standing on foam- tap up 6 in step x15 B -supervision   Rows green TB EC x15 (Grounding)   Perturbation training- fitness slider   -fwd x15-20 B   -lateral x15-20 B   -backwards x15-20 B   Ambulation stepping over hurdles (high, 6) 6 bouts -supervision   Seated unsupported on dynadisc (feet off of floor) - visual scanning task to touch lighted target with hands- 2 bouts, multiple reps   1 foot on bosu ball- fwd, backwards shuffles to lighted target, bending down to touch with hand 2 bouts ea foot on ball   Lateral, quick shuffles to lighted target, tap with foot then squat hold- 2 bouts, multiple reps  Verbal review of previously provided HEP  Pt selected INGRID - EC, 2 bouts max hold (30 sec) VC's for grounding and postural corrective movements   Step stance EO with arm swing 30 sec ea foot in front   Fwd step 1 foot with UE \"push\" ant wt shift x10 B  Ambulation outdoors with horizontal head turns ~60 ft x2   PT selected INGRID alt trunk rotation -demo with return demo x10 B   Rebounder 4#  -pt selected INGRID x15  -small step stance x10 ea foot in front   Step outs all directions over obstacle (Stick) following commands on screen -multiple reps, 2 bouts   Step outs all directions to cones with dual task (cog, recall) following commands on screen -multiple reps. 2 bouts   Walking changing speeds on command including quick stops ~60 ft x4  FGA completed  MiniBEST completed  Additional balance assessment as above  Review of trunk rotation exercise provided for home last session  Encourage continued performance of HEP and for movement including of head post surgically to tolerance. All pt questions answered within scope of PT practice. Improvements in therapy discussed with pt.    Neuro Re-Educ Minutes 38 40 40 39   Total Time Of Timed  Procedures 38 40 40 39   Total Time Of Service-Based Procedures 0 0 0 0   Total Treatment Time 38 40 40 39          Charges  3 NMR         Plan: Discharge    Patient was advised of these findings, precautions, and treatment options and has agreed to actively participate in planning and for this course of care.    Thank you for your referral. If you have any questions, please contact me at Dept: [phone]    Sincerely,    Zeynep Hill PT, NCS    Electronically signed by therapist: Zeynep Hill PT

## 2025-05-05 ENCOUNTER — APPOINTMENT (OUTPATIENT)
Dept: PHYSICAL THERAPY | Facility: HOSPITAL | Age: 65
End: 2025-05-05
Attending: PHYSICAL MEDICINE & REHABILITATION
Payer: COMMERCIAL

## 2025-05-07 ENCOUNTER — APPOINTMENT (OUTPATIENT)
Dept: PHYSICAL THERAPY | Facility: HOSPITAL | Age: 65
End: 2025-05-07
Attending: PHYSICAL MEDICINE & REHABILITATION
Payer: COMMERCIAL

## 2025-07-03 NOTE — PROGRESS NOTES
Patient: Liliya Stockton (64 year old, female) Referring Provider:  Insurance:   Diagnosis: Compresion fracture s/p kyphoplasty of L2 vertebrae, L knee pain and left leg pain Tamiko Mendozasalvador  [employer] POS   Date of Surgery: 12/12/24 Next MD visit:  N/A   Precautions:    No data recorded Referral Information:    Date of Evaluation: Req: 0, Auth: 0, Exp:     02/07/25 POC Auth Visits:          Today's Date   2/17/2025    Subjective          Pain: 5/10     Objective  see flowsheet                Assessment  tolerated all treatment well    Goals (to be met in 12 visits)   Goals       Therapy Goals      Not Met Progress Toward Partially Met Met   Pt will improve transversus abdominis recruitment to perform proper isometric contraction without requiring verbal or tactile cuing to promote advancement of therex. [] [] [] []   Pt will demonstrate good understanding of proper posture and body mechanics to decrease pain and improve spinal safety. [] [] [] []          Pt will report improved symptom centralization and absence of radicular symptoms for 3 consecutive days to improve function with ADLs. [] [] [] []   Pt will have decreased paraspinal mm tension to tolerate standing >30 minutes for work and home activities. [] [] [] []   Pt will demonstrate improved core strength to be able to perform squat to chair with <2/10 pain. [] [] [] []   Pt will be independent and compliant with comprehensive HEP to maintain progress achieved in PT [] [] [] []                  Plan  continue with plan of care    Treatment Last 4 Visits       2/7/2025 2/14/2025 2/17/2025 2/19/2025   PT Treatment   Treatment Day    3    2   Therapeutic Exercise  Prone leg extension, alternating  Prone UE lift, alternating  Abdominal bracing, legs supported Prone leg extension, alternating  Prone UE lift, alternating  Abdominal bracing, legs supported      Neuro Re-Ed  Toe yoga  Toe spreading  [place] toe curls, 3 x 15   Toe yoga  Toe spreading  Draper toe curls, 3 x  15      Manual Therapy  STM to L hip ER group STM to L hip ER group    Therapeutic Activity  Mini squat on wall  Step up, 4'' step  Psoas stretch, manual Mini squat on wall  Step up, 4'' step  Psoas stretch, manual        Therapeutic Exercise Min  15 10    Neuro Re-Ed Min  10 10    Manual Therapy Min  10 10    Therapeutic Activity Min  10 10    Total of Timed Procedures 0 45 40    Total of Service Based 0 0 0    Total Treatment Time 0 45 40        Multiple values from one day are sorted in reverse-chronological order         HEP  Access Code: [email or web address]  URL: [email or web address]  Date: 02/17/2025  Prepared by: Jad Hernandez    Exercises  - Bird Dog on Counter  - 1 x daily - 7 x weekly - 2 sets - 3 seconds hold - 1 minute total time  - Standing Side Plank on Wall  - 1 x daily - 7 x weekly - 2 sets - 30 seconds hold  - Standing shoulder flexion wall slides  - 1 x daily - 7 x weekly - 2 sets - 1 minute time  - Standing Shoulder Horizontal Abduction with Resistance  - 1 x daily - 7 x weekly - 2 sets - 2 hold - 1 minute time  - Seated Arch Lifts  - 1 x daily - 7 x weekly - 50 reps  - Toe Yoga - Alternating Great Toe and Lesser Toe Extension  - 1 x daily - 7 x weekly - 50 reps  - Toe Spreading  - 1 x daily - 7 x weekly - 50 reps    Charges                                      [employer] Pregnancy And Lactation Text: This medication is Pregnancy Category X and is absolutely contraindicated during pregnancy. It is unknown if it is excreted in breast milk.

## (undated) NOTE — LETTER
Patient Name: Liliya Stockton  YOB: 1960          MRN number:  [id number]  Date:  2/17/2025  Referring Physician:  Tamiko Flood         SPINE EVALUATION:     Diagnosis:   Compresion fracture s/p kyphoplasty of L2 vertebrae, L knee pain and left leg pain Patient:  Liliya Stockton (64 year old, female)        Referring Provider: Tamiko Flood  Today's Date   2/17/2025    Precautions:      Date of Evaluation: 02/07/25  Next MD visit: No data recorded  Date of Surgery: 12/12/24     PATIENT SUMMARY   Summary of chief complaints: Low back, left leg/knee pain.  Falls frequently.  Had a VNG done years ago.  History of current condition: Has had a history of falls in the last 2 years. Fell on stair case 12/8/24   Pain level: current  , at best  , at worst    Description of symptoms: Tightness with occassional numbness/tingling (feels sock-like numbness)   Occupation:     Leisure activities/Hobbies:     Prior level of function:    Current limitations: Trouble sitting (has to work at home with standing desk)  Pt goals: Increase mobility, decrease pain, and improve strength  Past medical history was reviewed with Liliya.  Significant findings include: Osteoporosis, L sensorineural complete hearing loss  Imaging/Tests: X-ray, MRI, DEXA scan   Liliya  has a past medical history of Essential hypertension.  She  has no past surgical history on file.    ASSESSMENT  Liliya presents to physical therapy evaluation with primary c/o Low back, left leg/knee pain.  Falls frequently.  Had a VNG done years ago.. The results of the objective tests and measures show  . Functional deficits include but are not limited to Trouble sitting (has to work at home with standing desk). Signs and symptoms are consistent with diagnosis of Compresion fracture s/p kyphoplasty of L2 vertebrae, L knee pain and left leg pain. Pt and PT discussed evaluation findings, pathology, POC and HEP.  Pt voiced understanding and performs HEP correctly  without reported pain. Skilled Physical Therapy is medically necessary to address the above impairments and reach functional goals.    OBJECTIVE:   Musculoskeletal:  Observation/Posture: sits with stooped posture   Accessory Motion:     Palpation: hypotonic R QL, lumbar ES, lumbar PVM     Special tests:         CAROLINE ROM WNL and Strength (5/5) except below:  (* denotes performed with pain)  Trunk ROM MMT (-/5)     Flex         Ext        R L R L     Side bend             Rotation           ,   Hip   ROM MMT (-/5)    R L R L     Flex (L2)     5 4     Ext      4 4     Abd     4 3+     ER     4 3+     IR     5 4     ,   Knee   ROM MMT (-/5)    R L R L     Flex             Ext (L3)     4+ 4     ,   Ankle/Foot   ROM MMT (-/5)    R L R L     PF             DF (L4)             Inversion             Eversion             Grt Toe Ext (L5)               Flexibility:  LE Flexibility R L     Hip Flexor         Hamstrings         ITB         Piriformis         Quads         Gastroc-soleus            Neurological:  Sensation: Grossly intact to light touch CAROLINE UE/LE except hyposensitivity to L L4-S1   Deep Tendon Reflexes: WNL except WNL   UMN: signs and symptoms WNL except     Peripheral Neurodynamic: WNL except       Balance and Functional Mobility:  Gait: pt ambulates on level ground with  .  Balance: SLS: EO R  , EO L           Today's Treatment and Response:   Pt education was provided on exam findings, treatment diagnosis, treatment plan, expectations, and prognosis.  Today's Treatment       2/7/2025   PT Treatment   Total of Timed Procedures 0   Total of Service Based 0   Total Treatment Time 0         Patient was instructed in and issued a HEP for: Access Code: [email or web address]  URL: [email or web address]  Date: 02/17/2025  Prepared by: Jad Hernandez    Exercises  - Bird Dog on Counter  - 1 x daily - 7 x weekly - 2 sets - 3 seconds hold - 1 minute total time  - Standing Side Plank on Wall  - 1 x daily - 7 x  weekly - 2 sets - 30 seconds hold  - Standing shoulder flexion wall slides  - 1 x daily - 7 x weekly - 2 sets - 1 minute time  - Standing Shoulder Horizontal Abduction with Resistance  - 1 x daily - 7 x weekly - 2 sets - 2 hold - 1 minute time  - Seated Arch Lifts  - 1 x daily - 7 x weekly - 50 reps  - Toe Yoga - Alternating Great Toe and Lesser Toe Extension  - 1 x daily - 7 x weekly - 50 reps  - Toe Spreading  - 1 x daily - 7 x weekly - 50 reps    Charges:  PT EVAL: Moderate Complexity,    In agreement with evaluation findings and clinical rationale, this evaluation involved MODERATE COMPLEXITY decision making due to 1-2 personal factors/comorbidities, 3 or more body structures involved/activity limitations, and evolving symptoms as documented in the evaluation.                                                                         PLAN OF CARE:    Goals: (to be met in 12 visits)   Goals       Therapy Goals      Not Met Progress Toward Partially Met Met   Pt will improve transversus abdominis recruitment to perform proper isometric contraction without requiring verbal or tactile cuing to promote advancement of therex. [] [] [] []   Pt will demonstrate good understanding of proper posture and body mechanics to decrease pain and improve spinal safety. [] [] [] []          Pt will report improved symptom centralization and absence of radicular symptoms for 3 consecutive days to improve function with ADLs. [] [] [] []   Pt will have decreased paraspinal mm tension to tolerate standing >30 minutes for work and home activities. [] [] [] []   Pt will demonstrate improved core strength to be able to perform squat to chair with <2/10 pain. [] [] [] []   Pt will be independent and compliant with comprehensive HEP to maintain progress achieved in PT [] [] [] []                   Frequency / Duration: Patient will be seen 2x/week or a total of 12  visits over a 90 day period. Treatment will include: Manual Therapy;  Neuromuscular Re-education; Home Exercise Program instruction; Therapeutic Activities; Therapeutic Exercise    Education or treatment limitation: None   Rehab Potential: fair     Oswestry Disability Index Score  Score: (Proxy-Rptd) 54 % (2/7/2025  2:07 PM)      Patient/Family/Caregiver was advised of these findings, precautions, and treatment options and has agreed to actively participate in planning and for this course of care.    Thank you for your referral. Please co-sign or sign and return this letter via fax as soon as possible to [phone]. If you have any questions, please contact me at Dept: [phone]    Sincerely,  Electronically signed by therapist: Jad Hernandez PT  Physician's certification required: Yes  I certify the need for these services furnished under this plan of treatment and while under my care.    X___________________________________________________ Date____________________    Certification From: 2/17/2025  To:5/18/2025 21st Century Cures Act Notice to Patient: Medical documents like this are made available to patients in the interest of transparency. However, be advised this is a medical document and it is intended as zomi-au-ynyy communication between your medical providers. This medical document may contain abbreviations, assessments, medical data, and results or other terms that are unfamiliar. Medical documents are intended to carry relevant information, facts as evident, and the clinical opinion of the practitioner. As such, this medical document may be written in language that appears blunt or direct. You are encouraged to contact your medical provider and/or [facility] if you have any questions about this medical document.

## (undated) NOTE — LETTER
Date & Time: 4/29/2024, 6:03 PM  Patient: Liliya Stockton  Encounter Provider(s):    Hussain Cherry APRN       To Whom It May Concern:    Liliya Stockton was seen and treated in our department on 4/29/2024. She should not return to work until 5/2/24 .    If you have any questions or concerns, please do not hesitate to call.        _____________________________  Physician/APC Signature